# Patient Record
Sex: MALE | Race: WHITE
[De-identification: names, ages, dates, MRNs, and addresses within clinical notes are randomized per-mention and may not be internally consistent; named-entity substitution may affect disease eponyms.]

---

## 2017-07-11 ENCOUNTER — HOSPITAL ENCOUNTER (EMERGENCY)
Dept: HOSPITAL 25 - ED | Age: 52
Discharge: HOME | End: 2017-07-11
Payer: MEDICARE

## 2017-07-11 VITALS — SYSTOLIC BLOOD PRESSURE: 123 MMHG | DIASTOLIC BLOOD PRESSURE: 88 MMHG

## 2017-07-11 DIAGNOSIS — S92.301A: Primary | ICD-10-CM
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
## 2017-07-11 DIAGNOSIS — F41.9: ICD-10-CM
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
## 2017-07-11 PROCEDURE — 99282 EMERGENCY DEPT VISIT SF MDM: CPT

## 2017-07-11 NOTE — ED
Lower Extremity





- HPI Summary


HPI Summary: 





Patient presents with right foot swelling and pain which has been worsening x 3 

weeks and denies injury, denies any fevers.  He states he has decreased 

sensation bilaterally in his legs at baseline.  He is anxious during physical 

exam.  He is concerned for an infection.  Denies fevers, chills or sweats. He 

states he does not work and is infrequently on his feet, but has been up more 

lately.  No pain in the calf or the posterior knee.  Denies trauma, denies 

recent travel.





- History of Current Complaint


Chief Complaint: EDSoftTissueLowExtr


Stated Complaint: RIGHT FOOT SWELLING


Time Seen by Provider: 07/11/17 19:37


Pain Intensity: 2





- Risk Factors


Gout Risk Factors: Male


DVT Risk Factors: Negative


Septic Arthritis Risk Factor: Negative





- Allergies/Home Medications


Allergies/Adverse Reactions: 


 Allergies











Allergy/AdvReac Type Severity Reaction Status Date / Time


 


Mixed Ragweed Allergy  Congestion Verified 07/11/17 19:45


 


NOTE/MISC Allergy  Congestion Verified 07/11/17 19:45














PMH/Surg Hx/FS Hx/Imm Hx


Previously Healthy: Yes


Endocrine/Hematology History: 


   Denies: Hx Diabetes


Cardiovascular History: 


   Denies: Hx Congestive Heart Failure, Hx Hypertension, Hx Pacemaker/ICD


 History: 


   Denies: Hx Renal Disease


Musculoskeletal History: 


   Denies: Hx Rheumatoid Arthritis, Hx Osteoporosis


Sensory History: 


   Denies: Hx Hearing Aid


Neurological History: Reports: Other Neuro Impairments/Disorders - Hx LUMBAR Fx


Psychiatric History: Reports: Hx Anxiety


   Denies: Hx Panic Disorder





- Surgical History


Surgery Procedure, Year, and Place: PILONIDAL CYST(LOWER BACK) X 4.  CYST ON 

LEFT ELBOW REMOVED.  BASAL CELL REMOVED FROM RIGHT CHEEK





- Immunization History


Hx Pertussis Vaccination: No


Immunizations Up to Date: Unable to Obtain/Confirm


Infectious Disease History: No


Infectious Disease History: 


   Denies: Traveled Outside the [place] in Last 30 Days





- Family History


Known Family History: 


   Negative: Cardiac Disease, Hypertension, Diabetes





- Social History


Occupation: Unemployed


Lives: Alone


Alcohol Use: Occasionally


Hx Substance Use: No


Substance Use Type: Reports: None


Substance Use Comment - Amount & Last Used: one drink every two weeks


Hx Tobacco Use: No


Smoking Status (MU): Never Smoked Tobacco





Review of Systems


Constitutional: Negative


ENT: Negative


Respiratory: Negative


Positive: no symptoms reported, see HPI


Positive: Arthralgia - right foot pain


Skin: Negative


Neurological: Negative


Positive: Anxious


All Other Systems Reviewed And Are Negative: Yes





Physical Exam


Triage Information Reviewed: Yes


Vital Signs On Initial Exam: 


 Initial Vitals











Temp Pulse Resp BP Pulse Ox


 


 97.8 F   77   16   133/91   95 


 


 07/11/17 19:45  07/11/17 19:45  07/11/17 19:45  07/11/17 19:45  07/11/17 19:45











Vital Signs Reviewed: Yes


Appearance: Positive: Well-Appearing, Well-Nourished


Skin: Positive: Warm, Skin Color Reflects Adequate Perfusion


Neck: Positive: Supple, Nontender, No Lymphadenopathy


Respiratory/Lung Sounds: Positive: Breath Sounds Present


Cardiovascular: Positive: Normal, RRR


Musculoskeletal: Positive: Pain @ - over the dorsum of the right foot


Neurological: Positive: Sensory/Motor Intact, Alert, Oriented to Person Place, 

Time


Psychiatric: Positive: Anxious


AVPU Assessment: Alert





- Zenon Coma Scale


Best Eye Response: 4 - Spontaneous


Best Motor Response: 6 - Obeys Commands


Best Verbal Response: 5 - Oriented


Coma Scale Total: 15





Diagnostics





- Vital Signs


 Vital Signs











  Temp Pulse Resp BP Pulse Ox


 


 07/11/17 19:45  97.8 F  77  16  133/91  95














- Laboratory


Lab Statement: Any lab studies that have been ordered have been reviewed, and 

results considered in the medical decision making process.





Lower Extremity Course/Dx





- Course


Course Of Treatment: Patient sent to MRI to r/o osteomyelitis sent by 

podiatrist.





- Diagnoses


Differential Diagnosis/HQI/PQRI: Positive: Infection, Sprain, Strain, Tendonitis


Provider Diagnoses: 


 Stress fracture








Discharge





- Discharge Plan


Condition: Stable


Disposition: HOME


Patient Education Materials:  Foot Fracture in Adults (ED)


Referrals: 


Ginette Greene NP [Primary Care Provider] - 


Additional Instructions: 


You have been dx with stress fracture





Stress fractures at low-risk sites are managed conservatively with the 

following interventions 


Acute pain control as needed using ice, and acetaminophen or ibuprofen


Protection of the fracture site with reduced weight-bearing or ace wrapped for 

comfort


Boot given only if you feel this reduces the pain during ambulation


Purchase supportive insoles for shoes to aid in foot structure


Reduction or modification of activities such that pain is not present


Gradual resumption of activities if pain-free


Rehabilitative exercise to promote optimal biomechanics


Proper nutrition, including additional calcium and vitamin D





Follow up with PCP 


Call next week for appt.

## 2017-07-12 NOTE — RAD
Indication: Multiple weeks RIGHT foot pain and swelling.



Comparison: June 07, 2017 radiographs.



Technique: [employer] 1.5 Sahra [id number] with GEM suite. Noncontrast MRI RIGHT foot from

level of the transverse tarsal joint proximally through the toes. Examination limited due

to multiplanar T1 and inversion recovery series.



Report: Extensive predominant subcutaneous edema as well as edema involving the intrinsic

musculature surrounding the second metatarsal. No loculated soft tissue fluid collection

evident.



Extensive T2 hyperintense marrow edema at the second metatarsal from the base through the

distal diaphysis. Nondisplaced oblique coronal fracture plane at the proximal metaphysis.

Corresponding loss of normal T1 marrow hyperintensity.



There is less specific T2 hyperintense marrow edema at the cuboid, lateral cuneiform, and

proximal aspect of the third metatarsal. Partial loss of normal T1 marrow hyperintensity

at the distal aspect of the cuboid without definitive fracture plane.



Normal articular alignment.



***Key images saved on the [facility] [place] [facility].



IMPRESSION: 

1. Nondisplaced fracture proximal metaphysis second metatarsal.

2. Noted extensive subcutaneous edema and edema surrounding the second metatarsal is

nonspecific. The edema may be secondary to the second metatarsal stress fracture and

stress reactions or reactive edema at the third metatarsal, lateral cuneiform, and cuboid

bone however cellulitis with deep soft tissue infection and osteomyelitis is not entirely

excluded. The fracture at the second metatarsal may represent an insufficiency fracture

secondary to osteomyelitis. Clinical correlation warranted.



Results discussed with Charge Nurse Beverly in the ED 7/12/2017 8:07 AM EDT

## 2018-03-12 ENCOUNTER — HOSPITAL ENCOUNTER (OUTPATIENT)
Dept: HOSPITAL 25 - OR | Age: 53
Discharge: HOME | End: 2018-03-12
Attending: ORTHOPAEDIC SURGERY
Payer: MEDICARE

## 2018-03-12 VITALS — DIASTOLIC BLOOD PRESSURE: 91 MMHG | SYSTOLIC BLOOD PRESSURE: 147 MMHG

## 2018-03-12 DIAGNOSIS — E78.5: ICD-10-CM

## 2018-03-12 DIAGNOSIS — F42.9: ICD-10-CM

## 2018-03-12 DIAGNOSIS — S92.321K: Primary | ICD-10-CM
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
## 2018-03-12 DIAGNOSIS — G89.18: ICD-10-CM

## 2018-03-12 PROCEDURE — C1713 ANCHOR/SCREW BN/BN,TIS/BN: HCPCS

## 2018-03-12 PROCEDURE — C1776 JOINT DEVICE (IMPLANTABLE): HCPCS

## 2018-03-13 NOTE — OP
DATE OF OPERATION:  03/12/18 - hospitals

 

DATE OF BIRTH:  [birth year]

 

SURGEON:  Fredy Martinez MD

 

ASSISTANT:  Fabiana Etienne PA-C

 

PRE-OP DIAGNOSIS:  Fractured nonunion right second metatarsal.

 

POST-OP DIAGNOSIS:  Fractured nonunion right second metatarsal.

 

OPERATIVE PROCEDURE:  Open reduction internal fixation right second metatarsal 
with tibial bone graft.

 

DESCRIPTION OF PROCEDURE:  The patient was taken to the operating room.  Thigh 
tourniquet inflated.  We made a longitudinal incision over the dorsum of the 
midfoot exposing the proximal shaft of the second metatarsal.  A nonunion area 
was taken down with a freer elevator and a small osteotome.  Now, we prepared 
the nonunion site with a 2.4 mm power quiana and harvested some bone graft 
proximally at Gerdy's tubercle through a 3 cm oblique incision, small 
corticotomy made with a power bur and curette used to harvest some cancellous 
bone, which was placed along the nonunion site.

 

We then used an Arthrex 3.0 mm screw plate for the proximal fixation.  Two 
proximal screw holes were made into the proximal fragment and oblique screw 
across the nonunion site and then two distal locking screws.  X-ray 
intraoperatively showed satisfactory position of the plate.  We changed one 
screw that appeared to be too long.  We then irrigated thoroughly closing with 
Vicryl and nylon suture as well as compression dressing, plaster splint.  The 
proximal tibial donor site was packed with cancellous allograft, closed with 2-
0 Vicryl staples, compression dressing.

 

 [id number]/CPS #: [id number]

JENNIFER

## 2018-03-15 ENCOUNTER — HOSPITAL ENCOUNTER (EMERGENCY)
Dept: HOSPITAL 25 - ED | Age: 53
Discharge: ANOTHER HOSPITAL | End: 2018-03-15
Payer: MEDICARE

## 2018-03-15 VITALS — DIASTOLIC BLOOD PRESSURE: 60 MMHG | SYSTOLIC BLOOD PRESSURE: 137 MMHG
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
## 2018-03-15 DIAGNOSIS — E07.9: ICD-10-CM

## 2018-03-15 DIAGNOSIS — F42.9: ICD-10-CM

## 2018-03-15 DIAGNOSIS — I10: ICD-10-CM

## 2018-03-15 DIAGNOSIS — L76.82: Primary | ICD-10-CM

## 2018-03-15 DIAGNOSIS — L03.115: ICD-10-CM

## 2018-03-15 DIAGNOSIS — F41.9: ICD-10-CM

## 2018-03-15 DIAGNOSIS — E78.00: ICD-10-CM

## 2018-03-15 DIAGNOSIS — E66.9: ICD-10-CM

## 2018-03-15 LAB
BASOPHILS # BLD AUTO: 0.1 10^3/UL (ref 0–0.2)
EOSINOPHIL # BLD AUTO: 0.2 10^3/UL (ref 0–0.6)
HCT VFR BLD AUTO: 53 % (ref 42–52)
HGB BLD-MCNC: 17.7 G/DL (ref 14–18)
INR PPP/BLD: 0.99 (ref 0.77–1.02)
LYMPHOCYTES # BLD AUTO: 1.5 10^3/UL (ref 1–4.8)
MCH RBC QN AUTO: 29 PG (ref 27–31)
MCHC RBC AUTO-ENTMCNC: 34 G/DL (ref 31–36)
MCV RBC AUTO: 87 FL (ref 80–94)
MONOCYTES # BLD AUTO: 1.5 10^3/UL (ref 0–0.8)
NEUTROPHILS # BLD AUTO: 9 10^3/UL (ref 1.5–7.7)
NRBC # BLD AUTO: 0 10^3/UL
NRBC BLD QL AUTO: 0.1
PLATELET # BLD AUTO: 197 10^3/UL (ref 150–450)
RBC # BLD AUTO: 6.07 10^6/UL (ref 4–5.4)
WBC # BLD AUTO: 12.3 10^3/UL (ref 3.5–10.8)

## 2018-03-15 PROCEDURE — 36415 COLL VENOUS BLD VENIPUNCTURE: CPT

## 2018-03-15 PROCEDURE — 85025 COMPLETE CBC W/AUTO DIFF WBC: CPT

## 2018-03-15 PROCEDURE — 99284 EMERGENCY DEPT VISIT MOD MDM: CPT

## 2018-03-15 PROCEDURE — 83605 ASSAY OF LACTIC ACID: CPT

## 2018-03-15 PROCEDURE — 87040 BLOOD CULTURE FOR BACTERIA: CPT

## 2018-03-15 PROCEDURE — 85610 PROTHROMBIN TIME: CPT

## 2018-03-15 PROCEDURE — 85730 THROMBOPLASTIN TIME PARTIAL: CPT

## 2018-03-15 PROCEDURE — 86140 C-REACTIVE PROTEIN: CPT

## 2018-03-15 PROCEDURE — 80053 COMPREHEN METABOLIC PANEL: CPT

## 2018-03-15 NOTE — ED
Viktoria PATEL Abhishek, scribed for Pito Geiger MD on 03/15/18 at 0542 .





Lower Extremity





- HPI Summary


HPI Summary: 





The pt is a 51 y/o male presenting to the [facility] with a chief complaint of right 

lower thigh/leg swelling s/p surgery since 3 hours ago. The pt states that the 

area of the swelling is warm to the touch and exhibits redness. Pt had a 

surgery two days and flesh was removed from his right knee to cover steel plate 

in his foot. Pt reports of SOB and anxiety. Pt denies of fevers and chills. 

Symptoms aggravated by palpation and alleviated by nothing. The patient rates 

the pain 5/10 in severity. Medication hx reviewed. The physician who performed 

to surgery on the pt was Dr. Martinez.





- History of Current Complaint


Chief Complaint: EDExtremityLower


Stated Complaint: RIGHT LEG SWELLING


Time Seen by Provider: 03/15/18 01:59


Hx Obtained From: Patient


Onset of Pain: Hours - 3 hours ago


Onset/Duration: Hours - since 3 hours ago


Severity Initially: Moderate


Severity Currently: Moderate


Pain Intensity: 5


Pain Scale Used: 0-10 Numeric


Timing: Constant


Location: Is Discrete @ - right lower leg


Character Of Pain: Burning


Associated Signs And Symptoms: Positive: Swelling


Aggravating Factor(s): Other - palpation


Alleviating Factor(s): Nothing





- Allergies/Home Medications


Allergies/Adverse Reactions: 


 Allergies











Allergy/AdvReac Type Severity Reaction Status Date / Time


 


ragweed pollen Allergy Intermediate Congestion Verified 03/12/18 06:58














PMH/Surg Hx/FS Hx/Imm Hx


Endocrine/Hematology History: Reports: Hx Thyroid Disease - on meds


   Denies: Hx Diabetes, Hx Anemia - blood levels too high, has blood taken to 

thin out


Cardiovascular History: 


   Denies: Hx Congestive Heart Failure, Hx Hypertension, Hx Pacemaker/ICD


Respiratory History: Reports: Hx Asthma, Hx Sleep Apnea - no machine


 History: 


   Denies: Hx Renal Disease


Musculoskeletal History: Reports: Hx Arthritis, Other Musculoskeletal History - 

broke back 15 years ago


   Denies: Hx Rheumatoid Arthritis, Hx Osteoporosis


Sensory History: Reports: Hx Contacts or Glasses - reading


   Denies: Hx Cataracts, Hx Glaucoma, Hx Hearing Aid


Opthamlomology History: Reports: Hx Contacts or Glasses - reading


   Denies: Hx Cataracts, Hx Glaucoma


Neurological History: Reports: Other Neuro Impairments/Disorders - Hx LUMBAR Fx


Psychiatric History: Reports: Hx Anxiety, Hx Depression


   Denies: Hx Panic Disorder





- Cancer History


Hx Chemotherapy: No





- Surgical History


Surgery Procedure, Year, and Place: PILONIDAL CYST(LOWER BACK) X 4.  CYST ON 

LEFT ELBOW REMOVED.  BASAL CELL REMOVED FROM RIGHT CHEEK


Hx Anesthesia Reactions: No


Infectious Disease History: No


Infectious Disease History: 


   Denies: Traveled Outside the [place] in Last 30 Days





- Family History


Known Family History: 


   Negative: Cardiac Disease, Hypertension, Diabetes





- Social History


Alcohol Use: Occasionally


Hx Substance Use: No


Substance Use Type: Reports: None


Substance Use Comment - Amount & Last Used: one drink every two weeks


Hx Tobacco Use: No


Smoking Status (MU): Never Smoked Tobacco





Review of Systems


Negative: Fever, Chills


Eyes: Negative


ENT: Negative


Cardiovascular: Negative


Respiratory: Negative


Gastrointestinal: Negative


Genitourinary: Negative


Positive: Edema - right lower leg/thigh


Skin: Other - redness in the right lower leg/thigh that is warm to touch


Neurological: Negative


Positive: Anxious


All Other Systems Reviewed And Are Negative: Yes





Physical Exam





- Summary


Physical Exam Summary: 








VITAL SIGNS: Reviewed.


GENERAL: ~Patient is a well-developed and nourished (MALE). Pt is anxious and 

flushed. Patient is not in any acute respiratory distress.


HEAD AND FACE: No signs of trauma. No ecchymosis, hematomas or skull 

depressions. No sinus tenderness.


EYES: PERRLA, EOMI x 2, No injected conjunctiva, no nystagmus.


EARS: Hearing grossly intact. Ear canals and tympanic membranes are within 

normal limits.


MOUTH: Oropharynx within normal limits.


NECK: Supple, trachea is midline, no adenopathy, no JVD, no carotid bruit, no c-

spine tenderness, neck with full ROM.


CHEST: Symmetric, n


SKIN: Dry and warm


o tenderness at palpation


LUNGS: Clear to auscultation bilaterally. No wheezing or crackles.


CVS: Regular rate and rhythm, S1 and S2 present, no murmurs or gallops 

appreciated.


ABDOMEN: Soft, non-tender. Belly Distended. No rebound no guarding, and no 

masses palpated. Bowel sounds are normal.


EXTREMITIES: Used splint ortho glass by orthopedist, below the knee to the 

right foot. Right ankle to mid thigh shows redness. 


NEURO: Alert and oriented x 3. No acute neurological deficits. Speech is normal 

and follows commands.


Triage Information Reviewed: Yes


Vital Signs On Initial Exam: 


 Initial Vitals











Temp Pulse Resp BP Pulse Ox


 


 97.3 F   96   16   126/82   99 


 


 03/15/18 01:44  03/15/18 01:44  03/15/18 01:44  03/15/18 01:44  03/15/18 01:44











Vital Signs Reviewed: Yes





Diagnostics





- Vital Signs


 Vital Signs











  Temp Pulse Resp BP Pulse Ox


 


 03/15/18 05:04  98 F  96  18  118/62 


 


 03/15/18 05:00   93  13  99/45  95


 


 03/15/18 04:30   92  18  109/66  96


 


 03/15/18 04:17  99 F  92  18  121/54  98


 


 03/15/18 04:08   92  16  121/54  96


 


 03/15/18 04:00   93  22  143/116  97


 


 03/15/18 03:30   85  17  135/79  97


 


 03/15/18 03:22   84  19  132/71  97


 


 03/15/18 03:03   84  24  131/74  97


 


 03/15/18 03:00   85  20   97


 


 03/15/18 02:47   84  18   96


 


 03/15/18 02:45     109/75 


 


 03/15/18 01:44  97.3 F  96  16  126/82  99














- Laboratory


Lab Results: 


 Lab Results











  03/15/18 03/15/18 03/15/18 Range/Units





  02:30 02:30 02:30 


 


WBC   12.3 H   (3.5-10.8)  10^3/ul


 


RBC   6.07 H   (4.0-5.4)  10^6/ul


 


Hgb   17.7   (14.0-18.0)  g/dl


 


Hct   53 H   (42-52)  %


 


MCV   87   (80-94)  fL


 


MCH   29   (27-31)  pg


 


MCHC   34   (31-36)  g/dl


 


RDW   14   (10.5-15)  %


 


Plt Count   197   (150-450)  10^3/ul


 


MPV   8   (7.4-10.4)  um3


 


Neut % (Auto)   73.0   (38-83)  %


 


Lymph % (Auto)   12.6 L   (25-47)  %


 


Mono % (Auto)   12.2 H   (0-7)  %


 


Eos % (Auto)   1.8   (0-6)  %


 


Baso % (Auto)   0.4   (0-2)  %


 


Absolute Neuts (auto)   9.0 H   (1.5-7.7)  10^3/ul


 


Absolute Lymphs (auto)   1.5   (1.0-4.8)  10^3/ul


 


Absolute Monos (auto)   1.5 H   (0-0.8)  10^3/ul


 


Absolute Eos (auto)   0.2   (0-0.6)  10^3/ul


 


Absolute Basos (auto)   0.1   (0-0.2)  10^3/ul


 


Absolute Nucleated RBC   0   10^3/ul


 


Nucleated RBC %   0.1   


 


INR (Anticoag Therapy)  0.99    (0.77-1.02)  


 


APTT  30.3    (26.0-36.3)  seconds


 


Sodium    133  (133-145)  mmol/L


 


Potassium    4.0  (3.5-5.0)  mmol/L


 


Chloride    100 L  (101-111)  mmol/L


 


Carbon Dioxide    25  (22-32)  mmol/L


 


Anion Gap    8  (2-11)  mmol/L


 


BUN    12  (6-24)  mg/dL


 


Creatinine    0.92  (0.67-1.17)  mg/dL


 


Est GFR ( Amer)    110.7  (>60)  


 


Est GFR (Non-Af Amer)    86.1  (>60)  


 


BUN/Creatinine Ratio    13.0  (8-20)  


 


Glucose    92  ()  mg/dL


 


Lactic Acid     (0.5-2.0)  mmol/L


 


Calcium    9.6  (8.6-10.3)  mg/dL


 


Total Bilirubin    1.00  (0.2-1.0)  mg/dL


 


AST    23  (13-39)  U/L


 


ALT    19  (7-52)  U/L


 


Alkaline Phosphatase    84  ()  U/L


 


C-Reactive Protein    146.74 H  (< 5.00)  mg/L


 


Total Protein    7.0  (6.4-8.9)  g/dL


 


Albumin    3.8  (3.2-5.2)  g/dL


 


Globulin    3.2  (2-4)  g/dL


 


Albumin/Globulin Ratio    1.2  (1-3)  














  03/15/18 Range/Units





  02:30 


 


WBC   (3.5-10.8)  10^3/ul


 


RBC   (4.0-5.4)  10^6/ul


 


Hgb   (14.0-18.0)  g/dl


 


Hct   (42-52)  %


 


MCV   (80-94)  fL


 


MCH   (27-31)  pg


 


MCHC   (31-36)  g/dl


 


RDW   (10.5-15)  %


 


Plt Count   (150-450)  10^3/ul


 


MPV   (7.4-10.4)  um3


 


Neut % (Auto)   (38-83)  %


 


Lymph % (Auto)   (25-47)  %


 


Mono % (Auto)   (0-7)  %


 


Eos % (Auto)   (0-6)  %


 


Baso % (Auto)   (0-2)  %


 


Absolute Neuts (auto)   (1.5-7.7)  10^3/ul


 


Absolute Lymphs (auto)   (1.0-4.8)  10^3/ul


 


Absolute Monos (auto)   (0-0.8)  10^3/ul


 


Absolute Eos (auto)   (0-0.6)  10^3/ul


 


Absolute Basos (auto)   (0-0.2)  10^3/ul


 


Absolute Nucleated RBC   10^3/ul


 


Nucleated RBC %   


 


INR (Anticoag Therapy)   (0.77-1.02)  


 


APTT   (26.0-36.3)  seconds


 


Sodium   (133-145)  mmol/L


 


Potassium   (3.5-5.0)  mmol/L


 


Chloride   (101-111)  mmol/L


 


Carbon Dioxide   (22-32)  mmol/L


 


Anion Gap   (2-11)  mmol/L


 


BUN   (6-24)  mg/dL


 


Creatinine   (0.67-1.17)  mg/dL


 


Est GFR ( Amer)   (>60)  


 


Est GFR (Non-Af Amer)   (>60)  


 


BUN/Creatinine Ratio   (8-20)  


 


Glucose   ()  mg/dL


 


Lactic Acid  1.6  (0.5-2.0)  mmol/L


 


Calcium   (8.6-10.3)  mg/dL


 


Total Bilirubin   (0.2-1.0)  mg/dL


 


AST   (13-39)  U/L


 


ALT   (7-52)  U/L


 


Alkaline Phosphatase   ()  U/L


 


C-Reactive Protein   (< 5.00)  mg/L


 


Total Protein   (6.4-8.9)  g/dL


 


Albumin   (3.2-5.2)  g/dL


 


Globulin   (2-4)  g/dL


 


Albumin/Globulin Ratio   (1-3)  











Result Diagrams: 


 03/15/18 02:30





 03/15/18 02:30


Lab Statement: Any lab studies that have been ordered have been reviewed, and 

results considered in the medical decision making process.





Lower Extremity Course/Dx





- Course


Course Of Treatment: The pt is a 51 y/o male presenting to the [facility] with a 

chief complaint of right lower thigh/leg swelling that is warm to the touch. 

PSHx includes a steel plate placement in the right foot which required a piece 

of skin from the right knee. Pt received this surgery two days prior from the 

onset of the symptoms. The current symptoms began 3 hours ago approximately. Pt 

reports of SOB and anxiety. Pt is denies and is unsure of chills and fevers. 

Upon evaluation, we removed the old splint that was placed on by the 

orthopedist (Dr. Martinez). We discussed pt care with Dr. Frankenberg in the 

[facility] and he accepts pt care. The pt dx will be right lower leg cellulitis and 

will be admitted to the [facility].





- Diagnoses


Provider Diagnoses: 


 Cellulitis of right leg








- Physician Notifications


Discussed Care Of Patient With: Fred Frankenberg - We discussed pt care





Discharge





- Discharge Plan


Condition: Stable


Disposition: ADMITTED TO [facility]


Referrals: 


Ginette Greene NP [Primary Care Provider] - 





The documentation as recorded by the Viktoria jones Abhishek accurately reflects 

the service I personally performed and the decisions made by me, Pito Geiger MD.

## 2018-03-15 NOTE — CONS
CC:  [facility]; Dr. Fredy Martinez*

 

CONSULTATION REPORT:

 

DATE OF CONSULT:  03/15/18

 

PRIMARY CARE PROVIDER:  [facility].

 

SERVICE REQUESTING CONSULTATION:  Emergency room.

 

REASON FOR CONSULT:  Concern for cellulitis.

 

SOURCE OF INFORMATION:  History was obtained from interview with the patient, 
discussion with Dr. Fredy Martinez as well as Dr. Mathew as well as review of 
past medical records.

 

RELIABILITY:  Good.

 

CHIEF COMPLAINT:  Right knee swelling.

 

HISTORY OF PRESENT ILLNESS:  This is a 52-year-old man, recent surgery on 03/12/
18 including open reduction and internal fixation of right second metatarsal 
with tibial bone graft performed by Dr. Fredy Martinez who returned home, has 
been noncompliant with recommendations as reported by the patient's parents 
including has not remained off his leg and is not elevating leg who noted 
increasing swelling of his right knee and therefore presented to the emergency 
room.  Denies any fevers, chills or night sweats or pain.  He has had no cough, 
shortness of breath, nausea or vomiting as noted above, has been ambulating on 
the leg.  He was seen by the emergency room with swelling in his right leg, 
mild erythema.  He received a dose of antibiotics in the emergency room 
including vancomycin and Zosyn.  Blood cultures have been sent.  Discussed the 
care with Dr. Martinez and he confirms as long as this author does not think he 
requires IV antibiotics for developing cellulitis that the erythema may be 
secondary to some subcutaneous hemorrhage after the procedure.  My impression 
is as follows below:

 

REVIEW OF SYSTEMS:  Review of systems is negative for all systems except for 
swelling around his knee not intraarticular based on my exam.

 

PAST MEDICAL HISTORY:  Includes severe OCD, severe anxiety disorder, 
hypocholesterolemia, obesity, hypertension.

 

HOME MEDICATIONS:  Include,

 

1.  Loratadine 10 mg.

2.  Simvastatin 20 mg

3.  Levocetirizine 5 mg as needed.

4.  Mometasone 50 micrograms daily.

5.  Ventolin  micrograms twice daily.

6.  Seroquel 300 mg twice daily.

7.  Trazodone 50 mg twice daily.

8.  Clomipramine 50 mg 1 in the morning and 3 at bedtime.

9.  Gabapentin 600 mg 3 times a day.

10.  Aripiprazole 10 mg daily.

11.  Hydroxyzine 30 mg 3 times a day.

12.  Lorazepam 1 mg 3 times a day p.r.n.

 

PERTINENT VITALS IN THE EMERGENCY ROOM:  Include, T-max 99 degrees Fahrenheit, 
137/60, heart rate 95, respiratory rate is 16.  He is 95% on room air.

 

PHYSICAL EXAMINATION:  Sitting up in bed, interactive, pleasant, in no apparent 
distress.  Regular rate and rhythm.  No murmurs, rubs or gallops.  Lungs are 
clear to auscultation.  He is obese.  His oropharynx is clear.  He has moist 
mucous membranes.  His right lower extremity indicates some swelling of his 
right knee without evidence of effusion.  Skin exam is notable for some 
erythema over his right knee.  It does not involve the incision over tibial 
bone where graft was retrieved and lower section of it is completely linear, 
which will be inconsistent with cellulitis.  It extends up mildly of his right 
medial thigh.  Area was demarcated with a marker.  Nontender to palpation.  He 
is alert and oriented x3.

 

LABORATORY DATA:  Pertinent labs reviewed.  White blood cell count is 12.3 with 
73% neutrophils, hemoglobin is 17.7.  It is consistent with previous values.  
Platelets 197.  Lactic acid is 1.6, CRP is 146.

 

IMAGING STUDIES:  Data reviewed.  Lower extremity venous Doppler, no evidence 
of DVT.

 

ASSESSMENT AND PLAN:  A 52-year-old man presenting with swelling of his right 
lower extremity 2 days after surgery.  While this may represent a cellulitis, 
it may also represent postoperative changes.  He has received vancomycin and 
Zosyn in the emergency room.  I have recommended to continue Bactrim as well as 
first generation cephalosporin such as cefazolin.  Patient also agrees and we 
will prescribe to patient on discharge.  Patient was counseled at length to 
keep his leg up and not ambulate on it.  Today is Friday and he will follow up 
with Dr. Martinez on Monday. He has confirmed numerous times that he will make 
the appointment himself.  The area was demarcated and counseled the patient 
that should it extend beyond the area of demarcation or should he develop 
fevers or chills or if he develops worsening erythema, he should contact his 
physician immediately and/or present back to the emergency room.  He 
acknowledged understanding as well as his parents acknowledged understanding 
who are staying with him after the operation.

 

FOLLOWUP:  Evaluate for continued erythema and/or improvement.  Evaluate for 
continued need of antibiotics.  Blood cultures and additional antibiotics were 
drawn in the emergency room.  Please follow up, ensure they remain negative.  
No other changes in medications.

 

TIME SPENT:  Greater than 60 minutes was spent on evaluation of this patient.

 

 [id number] #: [id number]

JENNIFER

## 2018-03-16 NOTE — ED
IAlexis Angela, scribed for Michel Mathew MD on 03/15/18 at 0846 .





Progress





- Progress Note


Progress Note: 





Overnight the pt was admitted to the hospitalist services. After Dr. Cuevas saw 

the pt this morning and discussed the case with Dr. Martinez, they ordered an 

ultrasound of the right leg which was negative. Dr. Cuevas requested to 

discharge the pt home with follow up from Dr. Martinez tomorrow. He requested 

that I send the pt home with Bactrim and Cephalexin. 


I was not aware of the pt and the pt was not signed out to me, I just did the 

favor to discharge the pt home. 


Pt will be discharged home, in stable condition, with a diagnosis of cellulitis.


Condition: Stable


Disposition: Home





Course/Dx





- Diagnoses


Provider Diagnoses: 


 Cellulitis of right leg








The documentation as recorded by the Alexis jones Angela accurately reflects 

the service I personally performed and the decisions made by me, Michel Mathew MD.

## 2018-08-01 ENCOUNTER — HOSPITAL ENCOUNTER (OUTPATIENT)
Dept: HOSPITAL 25 - OR | Age: 53
Discharge: HOME | End: 2018-08-01
Attending: OTOLARYNGOLOGY
Payer: MEDICARE

## 2018-08-01 VITALS — DIASTOLIC BLOOD PRESSURE: 94 MMHG | SYSTOLIC BLOOD PRESSURE: 138 MMHG

## 2018-08-01 DIAGNOSIS — E66.01: ICD-10-CM

## 2018-08-01 DIAGNOSIS — K14.8: Primary | ICD-10-CM

## 2018-08-01 DIAGNOSIS — E78.5: ICD-10-CM

## 2018-08-01 DIAGNOSIS — J45.909: ICD-10-CM

## 2018-08-01 DIAGNOSIS — E03.9: ICD-10-CM

## 2018-08-01 DIAGNOSIS — F41.8: ICD-10-CM

## 2018-08-01 PROCEDURE — 88342 IMHCHEM/IMCYTCHM 1ST ANTB: CPT

## 2018-08-01 PROCEDURE — 88305 TISSUE EXAM BY PATHOLOGIST: CPT

## 2018-08-02 NOTE — OP
DATE OF OPERATION:  08/01/18 - [facility]

 

DATE OF BIRTH:  [birth year]

 

SURGEON:  Alec Rosales M.D.

 

PRE-OP DIAGNOSIS:  Ulcer, right lateral tongue.

 

POST-OP DIAGNOSIS:  Ulcer, right lateral tongue.

 

OPERATION PERFORMED:  Excision of right lateral tongue lesion.

 

BRIEF HISTORY:  This pleasant 52-year-old gentleman with persistent ulcer of 
the right tongue, painful, had multiple medical trials that had been 
unsuccessful.

 

DESCRIPTION OF PROCEDURE:  The patient was taken to the operating room.  The 
patient intubated.  Tongue was then examined.  Elliptical excision was carried 
out, 1 cm wide x 2 cm long.  The wound was closed in a single layer using 4-0 
Vicryl. The patient tolerated the procedure well without any complications.  He 
was awakened, extubated, and sent to recovery room in stable condition.

 

 [id number] #: [id number]

MTDD

## 2018-09-21 ENCOUNTER — HOSPITAL ENCOUNTER (EMERGENCY)
Dept: HOSPITAL 25 - ED | Age: 53
Discharge: HOME | End: 2018-09-21
Payer: MEDICARE

## 2018-09-21 VITALS — DIASTOLIC BLOOD PRESSURE: 87 MMHG | SYSTOLIC BLOOD PRESSURE: 132 MMHG
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
## 2018-09-21 DIAGNOSIS — S60.012A: Primary | ICD-10-CM

## 2018-09-21 DIAGNOSIS — M79.645: ICD-10-CM

## 2018-09-21 PROCEDURE — 99281 EMR DPT VST MAYX REQ PHY/QHP: CPT

## 2018-09-21 NOTE — ED
Upper Extremity Pain





- HPI Summary


HPI Summary: 


52 year male presents with left thumb injury today.  He states he closed it in 

a car door.  he is not on blood thinners.  He has ecchymosis noted to the 

palmar aspect of thumb at the joint.  Has subungual hematoma present. has full 

ROM with pain.  denies any previous injury to the area. is not diabetic.  

Patient requesting a nerve block.  





- History of Current Complaint


Chief Complaint: EDExtremityUpper


Stated Complaint: LT THUMB INJURY


Time Seen by Provider: 09/21/18 17:45





- Allergies/Home Medications


Allergies/Adverse Reactions: 


 Allergies











Allergy/AdvReac Type Severity Reaction Status Date / Time


 


ragweed pollen Allergy Intermediate Congestion Verified 09/21/18 16:49


 


Hayfever Allergy  Congestion Uncoded 09/21/18 16:49














PMH/Surg Hx/FS Hx/Imm Hx


Endocrine/Hematology History: Reports: Hx Thyroid Disease - on meds


   Denies: Hx Diabetes, Hx Anemia - blood levels too high, has blood taken to 

thin out


Cardiovascular History: 


   Denies: Hx Congestive Heart Failure, Hx Hypertension, Hx Pacemaker/ICD, 

Other Cardiovascular Problems/Disorders


Respiratory History: Reports: Hx Asthma, Hx Sleep Apnea - no machine


GI History: 


   Denies: Hx Gastroesophageal Reflux Disease, Other GI Disorders


 History: 


   Denies: Hx Renal Disease, Other  Problems/Disorders


Musculoskeletal History: Reports: Hx Arthritis, Other Musculoskeletal History - 

broke back 15 years ago


   Denies: Hx Rheumatoid Arthritis, Hx Osteoporosis


Sensory History: Reports: Hx Contacts or Glasses - reading


   Denies: Hx Cataracts, Hx Glaucoma, Hx Hearing Aid


Opthamlomology History: Reports: Hx Contacts or Glasses - reading


   Denies: Hx Cataracts, Hx Glaucoma


Neurological History: Reports: Hx Migraine - history of, none recent, Other 

Neuro Impairments/Disorders - Hx LUMBAR Fx


Psychiatric History: Reports: Hx Anxiety, Hx Depression


   Denies: Hx Panic Disorder





- Cancer History


Hx Chemotherapy: No





- Surgical History


Surgery Procedure, Year, and Place: PILONIDAL CYST(LOWER BACK) X 4.  CYST ON 

LEFT ELBOW REMOVED.  BASAL CELL REMOVED FROM RIGHT CHEEK.  RT FT 2ND METATARSAL 

REPAIR 3/2018


Hx Anesthesia Reactions: No


Infectious Disease History: No


Infectious Disease History: 


   Denies: Traveled Outside the [place] in Last 30 Days





- Family History


Known Family History: 


   Negative: Cardiac Disease, Hypertension, Diabetes





- Social History


Alcohol Use: Occasionally


Hx Substance Use: No


Substance Use Type: Reports: None


Substance Use Comment - Amount & Last Used: one drink every two weeks


Hx Tobacco Use: No


Smoking Status (MU): Never Smoked Tobacco





Review of Systems


Negative: Fever


Negative: Chest Pain


Negative: Shortness Of Breath


Positive: Myalgia - left thumb


Positive: Bruising - left thumb


All Other Systems Reviewed And Are Negative: Yes





Physical Exam


Triage Information Reviewed: Yes


Vital Signs On Initial Exam: 


 Initial Vitals











Temp Pulse Resp BP Pulse Ox


 


 97.7 F   80   16   132/87   97 


 


 09/21/18 16:49  09/21/18 16:49  09/21/18 16:49  09/21/18 16:49  09/21/18 16:49











Vital Signs Reviewed: Yes


Appearance: Positive: Well-Appearing


Skin: Positive: Warm, Dry, Other - ecchymosis to left thumb, subungual hematoma 

present


Head/Face: Positive: Normal Head/Face Inspection


Eyes: Positive: Normal, Conjunctiva Clear


ENT: Positive: Pharynx normal


Respiratory/Lung Sounds: Positive: Clear to Auscultation, Breath Sounds Present


Cardiovascular: Positive: Normal, RRR


Musculoskeletal: Positive: Strength/ROM Intact - left thumb, Other - capillary 

refill<2 secs, tenderness over IP joint


Neurological: Positive: Normal


Psychiatric: Positive: Normal





Procedures





- Nail Trepanation


  ** Left Thumb


Nail Trepanation Location: left thumb


Method of Drainage: nail cauterized


Sterile Dressing Applied: No - telfa, coband


Finger Splint: Yes - metal





Diagnostics





- Vital Signs


 Vital Signs











  Temp Pulse Resp BP Pulse Ox


 


 09/21/18 16:49  97.7 F  80  16  132/87  97














- Laboratory


Lab Statement: Any lab studies that have been ordered have been reviewed, and 

results considered in the medical decision making process.





- Radiology


  ** finger


Xray Interpretation: No Acute Changes


Radiology Interpretation Completed By: ED Physician





Course/Dx





- Course


Course Of Treatment: 52 year male presents with left thumb injury today.  He 

states he closed it in a car door.  he is not on blood thinners.  He has 

ecchymosis noted to the palmar aspect of thumb at the joint.  Has subungual 

hematoma present. has full ROM with pain.  denies any previous injury to the 

area. is not diabetic. capillary refill less then 2 seconds.  Patient 

requesting a nerve block.  Performed nail trepidation and placed a metal finger 

splint on the area.  X-ray read by me and Dr. jacuqes as no fracture.  Told to 

keep area elevated.  Told take ibuprofen as needed for pain.  Patient 

understands and agrees plan.





- Diagnoses


Differential Diagnosis/HQI/PQRI: Positive: Fracture (Closed), Hematoma, Strain


Provider Diagnoses: 


 Subungual hematoma, Contusion of left thumb








Discharge





- Sign-Out/Discharge


Documenting (check all that apply): Patient Departure





- Discharge Plan


Condition: Good


Disposition: HOME


Patient Education Materials:  Subungual Hematoma (ED)


Referrals: 


Abram Clark MD [Primary Care Provider] - 


Roula Lee MD [Medical Doctor] - 


Additional Instructions: 


Take Tylenol or ibuprofen every 6 hours as needed for pain


Apply ice, elevate   


Follow up with ortho if no improvement in a week


Return to ED if develop any new or worsening symptoms    





- Billing Disposition and Condition


Condition: GOOD


Disposition: Home

## 2018-09-21 NOTE — XMS REPORT
Hipolito Yancey

 Created on:2018



Patient:Hipolito Yancey

Sex:Male

:1965

External Reference #:[id number]





Demographics







 Address  [address] [place] [address]

 

 Mobile Phone  [phone]

 

 Email Address  [email or web address]

 

 Preferred Language  English

 

 Marital Status  Not  Or 

 

 Jehovah's witness Affiliation  Unknown

 

 Race  White

 

 Ethnic Group  Not  Or 









Author







 Organization  [employer]

 

 Address  [address]



   [place] [address]

 

 Phone  [phone]









Support







 Name  Relationship  Address  Phone

 

 Jennifer Yancey  Unavailable  Unavailable  [phone]









Care Team Providers







 Name  Role  Phone

 

 Abram Clark MD  Primary Care Physician  Unavailable









Payers







 Type  Date  Identification Numbers  Payment Provider  Subscriber

 

 Health Maintenance    Policy Number:  Medicare Blue o  Hipolito Yancey



 [facility] (O)    [id number]    









 Group Number: [id number]  [address] 

 

 PayID:   SUSANA Chaney [id number]









 Medigap Part B  Effective:  Policy Number:  Blue [facility]  Hipolito Yancey



   2017  [id number]  [facility]  









 Expires: 2017  Group Number: [id number]  [address] 









 PayID: [id number]  SUSANA Aaron [id number]









 Medigap Part B    Policy Number: [id number]  Medicaid  Hipolito Yancey









 Group Name: 1 1  [address]

 

 PayID: [id number]  [place] [address]







Problems







 Description

 

 No Information







Family History







 Date  Family Member(s)  Problem(s)  Comments

 

   General  Heart Disease  

 

   General  Hypertension  

 

   General  Stroke  

 

   General  Cancer  







Social History







 Type  Date  Description  Comments

 

 Lives With    Alone  

 

 Occupation    Disabled  

 

 Smoking    Patient has never smoked  







Allergies, Adverse Reactions, Alerts







 Date  Description  Reaction  Status  Severity  Comments

 

 2017  NKDA    active    







Medications







 Medication  Date  Status  Form  Strength  Qnty  SIG  Indications  Ordering



                 Provider

 

 Compression    Active  Misc    2Pair    S92.324D  Fredy Hawkins  7          mmhg knee    TIM Martinez



             high    









 R60.0









 Loratadine    Active  Tablets  10mg    Take One    Unknown



             Tablet By    



             Mouth Every    



             Day    

 

 Simvastatin    Active  Tablets  20mg    Take One    Unknown



             Tablet By    



             Mouth AT    



             Bedtime For    



             Cholesterol    

 

 Levocetirizine    Active  Tablets  5mg    Take One    Unknown



 Dihydrochloride            Tablet By    



             Mouth Every    



             Day as Needed    

 

 Mometasone Furoate    Active  Suspension  50mcg/A    Spray Two    
Unknown



         ct    Sprays In Each    



             Nostril Every    



             Day    

 

 Ventolin HFA    Active  Aerosol  108(90B    Inhale Two    Unknown



         ase)    Puffs By Mouth    



         mcg/Act    Every 4 Hours    



             as Needed    

 

 Quetiapine    Active  Tablets  300mg    Take Two    Unknown



 Fumarate            Tablets By    



             Mouth Every    



             Day    

 

 Trazodone HCL    Active  Tablets  50mg    Take One    Unknown



             Tablet By    



             Mouth Twice A    



             Day    

 

 Clomipramine HCL    Active  Capsules  50mg    Take One    Unknown



             Capsule By    



             Mouth Every    



             Morning And    



             Three AT    



             Bedtime    

 

 Gabapentin    Active  Capsules  300mg    Take 1   2    Unknown



             Capsules By    



             Mouth Three    



             Times A Day    

 

 Aripiprazole    Active  Tablets  10mg    Take One    Unknown



             Tablet By    



             Mouth Every    



             Day    

 

 Hydroxyzine HCL    Active  Tablets  50mg    Take One    Unknown



             Tablet By    



             Mouth Three    



             Times A Day    

 

 Lorazepam    Active  Tablets  1mg    Take One    Unknown



             Tablet By    



             Mouth Three    



             Times A Day    



             Maximum Daily    



             Dose   3    

 

                 

 

 Clindamycin HCL  2018 -  Hx  Capsules  300mg  15c  take one    Fredy



   2018        aps  tablet by    Juan,



             mouth three    M.D.



             times a day    



             for 5 days.    

 

 Probiotic &  2018 -  Hx  Capsules    10c  Take one    Frdey



 Acidophilus  2018        aps  tablet daily    Juan,



 Formula Extra            by mouth for    M.D.



 Strength            10 days.    

 

 Diphenhydramine  2018 -  Hx  Tablets  25mg  30t  Take 1 by    Fredy



 HCL  2018        abs  mouth three    Juan,



             times a day    M.D.

 

 Oxycodone HCL  2018 -  Hx  Tablets  5mg  15t  1 tab by mouth    Alejandro 
F



   2018        abs  every 6 hours    Cynthia,



             as needed for    MD



             pain.    

 

 Nystatin   -  Hx  Suspension  622989M    Swish And    Unknown



   2017      nit/ML    Swallow 5 ML    



             By Mouth Four    



             Times A Day    



             For 7 Days    



             Retain In    

 

 Symbicort   -  Hx  Aerosol  160-4.5    Inhale Two    Unknown



   2017      mcg/Act    Puffs By Mouth    



             Twice A Day    

 

 Sulfamethoxazole/T   -  Hx  Tablets  800-160        Ramona,



 rimethoprim DS  2017      DILEEP [place]







Medications Administered in Office







 Medication  Date  Status  Form  Strength  Qnty  SIG  Indications  Ordering



                 Provider

 

 Triamcinolone  /  Administered  Injection          Sumanth



 (Kenalog)                MD Mary

 

 Triamcinolone  /  Administered  Injection          Sumanth



 (Kenalog)                MD Mary







Vital Signs







 Date  Vital  Result  Comment

 

 2018  Height  65 inches  5'5"









 Weight  220.00 lb  

 

 Heart Rate  84 /min  

 

 BP Systolic Sitting  126 mmHg  

 

 BP Diastolic Sitting  80 mmHg  

 

 Respiratory Rate  16 /min  

 

 Pain Level  0  

 

 BMI (Body Mass Index)  36.6 kg/m2  









 2018  Height  65 inches  5'5"









 Weight  220.00 lb  

 

 Heart Rate  67 /min  

 

 Respiratory Rate  15 /min  

 

 Pain Level  2  

 

 BMI (Body Mass Index)  36.6 kg/m2  









 2018  Height  65 inches  5'5"









 Weight  220.00 lb  

 

 Heart Rate  72 /min  

 

 BP Systolic Sitting  134 mmHg  

 

 BP Diastolic Sitting  84 mmHg  

 

 Respiratory Rate  19 /min  

 

 Body Temperature  97.0 F  

 

 Pain Level  0  

 

 BMI (Body Mass Index)  36.6 kg/m2  









 05/10/2018  Height  65 inches  5'5"









 Weight  220.00 lb  

 

 BP Systolic  134 mmHg  

 

 BP Diastolic  82 mmHg  

 

 Respiratory Rate  20 /min  

 

 Body Temperature  97.7 F  

 

 Pain Level  0  

 

 BMI (Body Mass Index)  36.6 kg/m2  









 2018  Height  65 inches  5'5"









 Weight  220.00 lb  

 

 BP Systolic  142 mmHg  

 

 BP Diastolic  80 mmHg  

 

 Respiratory Rate  18 /min  

 

 Pain Level  0  

 

 BMI (Body Mass Index)  36.6 kg/m2  









 2018  Heart Rate  74 /min  









 BP Systolic  138 mmHg  

 

 BP Diastolic  78 mmHg  

 

 Respiratory Rate  14 /min  

 

 Body Temperature  98.3 F  

 

 Pain Level  0  









 2018  Heart Rate  88 /min  









 BP Systolic  130 mmHg  

 

 BP Diastolic  80 mmHg  

 

 Respiratory Rate  16 /min  

 

 Body Temperature  97.2 F  









 2018  Height  65 inches  5'5"









 Weight  215.00 lb  

 

 Heart Rate  94 /min  

 

 Respiratory Rate  15 /min  

 

 Body Temperature  98.4 F  

 

 Pain Level  6  

 

 BMI (Body Mass Index)  35.8 kg/m2  









 2018  Height  65 inches  5'5"









 Weight  215.00 lb  

 

 BP Systolic  122 mmHg  

 

 BP Diastolic  68 mmHg  

 

 Respiratory Rate  20 /min  

 

 Body Temperature  97.8 F  

 

 Pain Level  5  

 

 BMI (Body Mass Index)  35.8 kg/m2  









 2018  Height  65 inches  5'5"









 Weight  215.00 lb  

 

 Heart Rate  88 /min  

 

 Respiratory Rate  17 /min  

 

 Body Temperature  98.1 F  

 

 Pain Level  0  

 

 BMI (Body Mass Index)  35.8 kg/m2  









 2018  Height  65 inches  5'5"









 Weight  215.00 lb  

 

 Heart Rate  83 /min  

 

 Respiratory Rate  14 /min  

 

 Body Temperature  97.3 F  

 

 Pain Level  5  

 

 BMI (Body Mass Index)  35.8 kg/m2  









 2018  Height  65 inches  5'5"









 Weight  215.00 lb  

 

 Heart Rate  79 /min  

 

 BP Systolic  125 mmHg  

 

 BP Diastolic  85 mmHg  

 

 Body Temperature  96.5 F  

 

 BMI (Body Mass Index)  35.8 kg/m2  









 2017  Height  65 inches  5'5"









 Weight  226.00 lb  

 

 Heart Rate  82 /min  

 

 Respiratory Rate  18 /min  

 

 Body Temperature  98.1 F  

 

 Pain Level  0  

 

 BMI (Body Mass Index)  37.6 kg/m2  









 2017  Height  65 inches  5'5"









 Weight  226.00 lb  

 

 BP Systolic  120 mmHg  

 

 BP Diastolic  70 mmHg  

 

 Respiratory Rate  20 /min  

 

 Pain Level  0  

 

 BMI (Body Mass Index)  37.6 kg/m2  









 2017  Heart Rate  68 /min  









 BP Systolic Sitting  118 mmHg  

 

 BP Diastolic Sitting  90 mmHg  

 

 Body Temperature  97.2 F  









 2017  Heart Rate  72 /min  









 BP Systolic  115 mmHg  

 

 BP Diastolic  90 mmHg  

 

 Body Temperature  96.7 F  









 2017  Height  65 inches  5'5"









 Weight  226.00 lb  

 

 Heart Rate  80 /min  

 

 BP Systolic Sitting  138 mmHg  

 

 BP Diastolic Sitting  96 mmHg  

 

 Respiratory Rate  14 /min  

 

 Body Temperature  97.5 F  

 

 BMI (Body Mass Index)  37.6 kg/m2  









 2017  Height  65 inches  5'5"









 Weight  218.00 lb  

 

 Heart Rate  78 /min  

 

 BP Systolic  132 mmHg  

 

 BP Diastolic  92 mmHg  

 

 Respiratory Rate  18 /min  

 

 Body Temperature  97.4 F  

 

 BMI (Body Mass Index)  36.3 kg/m2  









 2017  Height  65 inches  5'5"









 Weight  221.00 lb  

 

 Heart Rate  72 /min  

 

 BP Systolic Sitting  120 mmHg  

 

 BP Diastolic Sitting  84 mmHg  

 

 Respiratory Rate  14 /min  

 

 Body Temperature  99.0 F  

 

 BMI (Body Mass Index)  36.8 kg/m2  









 2017  Height  65 inches  5'5"









 Weight  218.00 lb  

 

 Heart Rate  90 /min  

 

 BP Systolic  134 mmHg  

 

 BP Diastolic  86 mmHg  

 

 Respiratory Rate  18 /min  

 

 Body Temperature  97.7 F  

 

 Pain Level  5  

 

 BMI (Body Mass Index)  36.3 kg/m2  









 2017  Height  64 inches  5'4"









 Weight  215.00 lb  

 

 Heart Rate  62 /min  

 

 BP Systolic  116 mmHg  

 

 BP Diastolic  82 mmHg  

 

 Respiratory Rate  16 /min  

 

 Body Temperature  96.9 F  

 

 BMI (Body Mass Index)  36.9 kg/m2  







Results







 Test  Date  Test  Result  H/L  Range  Note

 

 Laboratory test  2018  Surgical Pathology  SEE RESULT BELOW      1



 finding            

 

 CBC Auto Diff  2018  White Blood Count  11.3 10^3/uL  High  3.5-10.8  









 Red Blood Count  5.79 10^6/uL  High  4.0-5.4  

 

 Hemoglobin  17.1 g/dL    14.0-18.0  

 

 Hematocrit  51 %    42-52  

 

 Mean Corpuscular Volume  88 fL    80-94  

 

 Mean Corpuscular Hemoglobin  30 pg    27-31  

 

 Mean Corpuscular HGB Conc  34 g/dL    31-36  

 

 Red Cell Distribution Width  14 %    10.5-15  

 

 Platelet Count  235 10^3/uL    150-450  

 

 Mean Platelet Volume  8 um3    7.4-10.4  

 

 Abs Neutrophils  8.6 10^3/uL  High  1.5-7.7  

 

 Abs Lymphocytes  1.6 10^3/uL    1.0-4.8  

 

 Abs Monocytes  0.9 10^3/uL  High  0-0.8  

 

 Abs Eosinophils  0.1 10^3/uL    0-0.6  

 

 Abs Basophils  0.1 10^3/uL    0-0.2  

 

 Abs Nucleated RBC  0.1 10^3/uL      

 

 Granulocyte %  76.2 %    38-83  

 

 Lymphocyte %  14.4 %  Low  25-47  

 

 Monocyte %  7.8 %  High  0-7  

 

 Eosinophil %  1.0 %    0-6  

 

 Basophil %  0.6 %    0-2  

 

 Nucleated Red Blood Cells %  0.7      









 CBC Auto Diff  2017  White Blood Count  8.9 10^3/uL    3.5-10.8  









 Red Blood Count  6.13 10^6/uL  High  4.0-5.4  

 

 Hemoglobin  18.4 g/dL  High  14.0-18.0  

 

 Hematocrit  56 %  High  42-52  

 

 Mean Corpuscular Volume  91 fL    80-94  

 

 Mean Corpuscular Hemoglobin  30 pg    27-31  

 

 Mean Corpuscular HGB Conc  33 g/dL    31-36  

 

 Red Cell Distribution Width  14 %    10.5-15  

 

 Platelet Count  220 10^3/uL    150-450  

 

 Mean Platelet Volume  8 um3    7.4-10.4  

 

 Abs Neutrophils  5.7 10^3/uL    1.5-7.7  

 

 Abs Lymphocytes  2.1 10^3/uL    1.0-4.8  

 

 Abs Monocytes  1.0 10^3/uL  High  0-0.8  

 

 Abs Eosinophils  0.2 10^3/uL    0-0.6  

 

 Abs Basophils  0 10^3/uL    0-0.2  

 

 Abs Nucleated RBC  0.01 10^3/uL      

 

 Granulocyte %  63.5 %    38-83  

 

 Lymphocyte %  23.5 %  Low  25-47  

 

 Monocyte %  10.8 %  High  1-9  

 

 Eosinophil %  1.8 %    0-6  

 

 Basophil %  0.4 %    0-2  

 

 Nucleated Red Blood Cells %  0.1      









 Laboratory test finding  2017  Hepatitis C Antibody  Nonreactive    
Nonreactive  









 C Reactive Protein  2.52 mg/L    < 5.00  2









 HIV 1/2 AB Evaluation  2017  HIV 1 2 Antibody  Nonreactive    
Nonreactive  3

 

 Laboratory test finding  2017  C Reactive Protein  18.21 mg/L  High  < 
5.00  4

 

 CBC Auto Diff  2017  White Blood Count  9.5 10^3/uL    3.5-10.8  









 Red Blood Count  6.50 10^6/uL  High  4.0-5.4  

 

 Hemoglobin  19.8 g/dL  High  14.0-18.0  

 

 Hematocrit  61 %  High  42-52  

 

 Mean Corpuscular Volume  94 fL    80-94  

 

 Mean Corpuscular Hemoglobin  31 pg    27-31  

 

 Mean Corpuscular HGB Conc  33 g/dL    31-36  

 

 Red Cell Distribution Width  15 %    10.5-15  

 

 Platelet Count  206 10^3/uL    150-450  

 

 Mean Platelet Volume  9 um3    7.4-10.4  

 

 Abs Neutrophils  6.2 10^3/uL    1.5-7.7  

 

 Abs Lymphocytes  1.8 10^3/uL    1.0-4.8  

 

 Abs Monocytes  1.2 10^3/uL  High  0-0.8  

 

 Abs Eosinophils  0.2 10^3/uL    0-0.6  

 

 Abs Basophils  0.1 10^3/uL    0-0.2  

 

 Abs Nucleated RBC  0.08 10^3/uL      

 

 Granulocyte %  65.2 %    38-83  

 

 Lymphocyte %  19.2 %  Low  25-47  

 

 Monocyte %  12.7 %  High  1-9  

 

 Eosinophil %  2.2 %    0-6  

 

 Basophil %  0.7 %    0-2  

 

 Nucleated Red Blood Cells %  0.9      









 Laboratory test finding  2017  Erythrocyte Sed Rate  0 mm/Hr    0-20  5









 Lyme Disease Serology  Negative    Negative  6

 

 Vitamin D, 1,25 Dihydroxy  33 pg/mL    18-64  7









 1  SEE RESULT BELOW



   -----------------------------------------------------------------------------
---------------



   Name:  HIPOLITO YANCEY                 : 1965    Attend Dr: 
Alvin Rosales MD



   Acct:  [id number]  Unit: [id number]  AGE: 52            Location:  OR



   Re18                        SEX: M             Status:    GELY [facility]



   -----------------------------------------------------------------------------
---------------



   



   SPEC: [id number]             YASMEEN: 18-          SUBM DR: Alvin Rosales MD



   REQ:  [id number]             RECD: 8



   STATUS: SOUT



   _



   ORDERED:  LEVEL 4, IMMUNO-FIRST



   



   FINAL DIAGNOSIS



   



   



   Tongue, right lateral aspect, biopsy:



   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  See 
comment.



   



   



   



   Comment: The sections demonstrates a somewhat sessile acutely



   inflamed verrucoid squamous proliferative lesion with focal



   architectural complexity and ulceration.  While focally



   suggestive, definitive invasion is not seen.  Dysplasia is



   difficult to evaluate due to the degree of acute inflammation



   and reactive changes.  The lesion extends to the unoriented



   inked surgical margin.  A p16 stain performed with appropriate



   controls on block B is positive confirming an HPV related



   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



   though and inflamed and traumatized verrucoid carcinoma cannot



   be entirely excluded.  Conservative reexcision or other



   ablation of this lesion may be considered.



   Alternatively,close clinical follow-up is recommended.



   



   



   Dr. Hall has reviewed this case and concurs.



   



   



   PRE-OPERATIVE DIAGNOSIS



   



   Right tongue lesion



   



   



   



   



   



   ** CONTINUED ON NEXT PAGE **



   



   DEPARTMENT OF PATHOLOGY,  [address] [place] [address]



   Phone # [phone]      Fax #[phone]



   Liam Hall M.D. Director     [facility] # [id number]



   



   



   



   RUN DATE: 18               [facility] **LIVE**         
       PAGE    2



   



   -----------------------------------------------------------------------------
---------------



   Patient: BCHIPOLITO                  [id number]     (Continued)



   -----------------------------------------------------------------------------
---------------



   



   GROSS DESCRIPTION          (Continued)



   



   



   GROSS DESCRIPTION



   



   The specimen is received in formalin labeled, Right Lateral Aspect of Tongue
, and consists



   of a 1.5 x 1.4 by up to 0.6 cm tan-gray ovoid wrinkled irregular soft tissue 
fragment which



   is inked, serially sectioned and submitted entirely in two cassettes.



   



   Signed by and Reported on: __________              Liam Hall MD  [id number]



   



   -----------------------------------------------------------------------------
---------------



   



   



   



   



   



   



   



   



   



   



   



   



   



   



   



   



   



   



   



   



   



   



   



   



   



   



   



   



   



   



   



   



   



   ** END OF REPORT **



   



   DEPARTMENT OF PATHOLOGY,  [address] [place] [address]



   Phone # [phone]      Fax #[phone]



   Liam Hall M.D. Director     [facility] # [id number]

 

 2  Acute inflammation:  >10.00

 

 3  It is recognized that currently available assays for the



   detection of antibodies to HIV-1 and/or HIV-2 may not detect



   all infected individuals. HIV antibodies may be undetectable



   in some stages of the infection and in some clinical



   conditions. The performance of this assay has not been



   established for populations of infants or children.



   



   Assayed by Chemiluminescence Microparticle Immunoassay on



   the Siemens Advia Centaur CP. Values obtained with different



   methods or kits cannot be used interchangeably.The



   diagnostic specificity of the ADVIA Centaur 1/O/2 Enhanced



   assay in the low risk population was 99.90% (6052/6058) with



   a 95% confidence interval of 99.78 to 99.96%.

 

 4  Acute inflammation:  >10.00

 

 5  [id number]

 

 6  Serologic response to B. burgdorferi infection is not



   detected, but cannot rule out early infection during



   which low or undetectable antibody levels to



   B. burgdorferi may be present. If clinically indicated,



   a new serum specimen should be submitted in 7-14 days.



   Test Performed by:



   [facility] - [address] [address]

 

 7  -------------------ADDITIONAL INFORMATION-------------------



   This test was developed and its performance characteristics



   determined by [facility] in a manner consistent with CLIA



   requirements. This test has not been cleared or approved by



   the U.S. Food and Drug Administration.



   Test Performed by:



   [facility] - [address] [address]







Procedures







 Date  CPT Code  Description  Status

 

 2018  09227  Repair Nonunion/Malunion Metatarsal  Completed

 

 2018  28717  Repair Nonunion/Malunion Metatarsal  Completed

 

 2018  56027  Injection Intralesional Up To And Including 7 Lesions  
Completed

 

 2018  11936  Injection Intralesional Up To And Including 7 Lesions  
Completed

 

 12/15/2017  52049  Dest Lesion Each Addl Lesion 2 Through 14 Each  Completed

 

 12/15/2017  99241  Destruction ALL Benign Or Premalignant Lesion (Other  
Completed



     Than [place]  

 

 2017  96886  Destruction ALL Benign Or Premalignant Lesion (Other  
Completed



     Than [place]  

 

 2017  93970  FX Metatarsal Care  Completed

 

 2017  24537  Repair Immediate Wound 2.6-5CM  Completed



     Face/Ear/Eyelid/Nose/Lip/Muc Mem  

 

 2017  06130  Excise Malig Lesion 1.1-2CM Face/Ear/Eyelid/Nose/Lip  
Completed







Encounters







 Type  Date  Location  Provider  CPT E/M  Dx

 

 Office Visit  2018 11:30a  [facility]  Sumanth Hernandez MD  [address]  
L90.5









 R60.0

 

 D22.5

 

 Z80.8

 

 Z08

 

 Z85.828









 Office Visit  2018 10:15a  [facility]  Fredy Martinez  
[address]  S92.324K



     MILAGROS DUMONT    

 

 Office Visit  2018 11:30a  [facility]  Hunter Maxwell MD  
[address]  R60.0



     C.M.ANanci      

 

 Office Visit  03/15/2018 10:43a  [facility] [place],  [address]  
L03.115



     [facility]  M.D.    

 

 Office Visit  2018 11:15a  [facility]  Fredy Martinez  
[address]  S92.324G



     MILAGRSO DUMONT    

 

 Office Visit  2018  9:45a  [facility]  Fredy Martinez  
[address]  S92.324D



     MILAGROS DUMONT    

 

 Office Visit  2018 11:30a  [facility]  Sumanth Hernandez MD  [address]  
L21.8









 L90.5









 Office Visit  2017 11:30a  [facility]  Hunter Maxwell MD  [address]
  S92.324S



     Of MILAGROS      









 M25.474









 Office Visit  2017  1:15p  [facility]  Fabiana Etienne  [address]  
S92.324D



     Of C.MTEA  RPA-C    

 

 Office Visit  2017 11:30a  [facility]  Dilip BYRD  [address]  R60.0



     Infectious Diseases  TIM Rhodes    

 

 Office Visit  2017  4:00p  [facility]  Dilip BYRD  [address]  R60.0



     Infectious Diseases  TIM Rhodes    









 S92.324A

 

 D45

 

 S92.324D









 Office Visit  2017  3:20p  [facility]  Sumanth Hernandez MD  [address]  
C44.91

 

 Office Visit  2017  9:45a  [facility]  Ananda Pierre MD  
[address]  L05.92



     [facility]      







Plan of Care

Future Appointment(s):2018 11:30 am - Fredy Martinez M[facility]2018 11:30 am - Sumanth Hernandez MD at [facility] Xgczfolxxkc63/23
/2018 - Fredy Martinez M.D.S92.324G Nondisp fx of 2nd metatarsal bone, r ft, 
7thGFollow up:5-6 weeks

## 2018-09-21 NOTE — XMS REPORT
Continuity of Care Document (CCD)

 Created on:2018



Patient:Hipolito James

Sex:Male

:1965

External Reference #:[id number]





Demographics







 Address  [address]



   [address]



   [place] [address]

 

 Home Phone  [phone]

 

 Mobile Phone  [phone]

 

 Preferred Language  en

 

 Marital Status  Not  or 

 

 Christianity Affiliation  Unknown

 

 Race  White

 

 Ethnic Group  Not  or 









Author







 Name  Abram Clark MD

 

 Address  [address]



   Unavailable



   [place] [address]









Care Team Providers







 Name  Role  Phone

 

 Abram Clark MD  Care Team Information   Unavailable









Payers







 Type  Date  Identification Numbers  Payment Provider  Subscriber

 

   Effective:  Policy Number: VYM  [employer]  Hipolito James



   2015  [id number]  [employer]  









 PayID: [id number]  [address] [address]









 [place] [address]









   Expires: 10/31/2015  Policy Number: [id number]  Medicare - [employer]  Hipolito James









 PayID: [id number]  [address]









 [place] [address]









     Policy Number: [id number]  Medicaid After Medicare  Hipolito James









 Group Name: 2 1  [address]

 

 PayID: [id number]  [place] [address]







Advance Directives







 Type  Date  Description  Status  Comment

 

 Other Directive  2011  Health Care Proxy  Current and Verified  

 

 Other Directive  2011  Power Of   Current and Verified  POA







Problems







 Description

 

 No Information







Family History







 Date  Family Member(s)  Problem(s)  Comments

 

   Father  Healthy  

 

   Mother  CAD  

 

   Mother  Cancer, Breast  

 

   Mother  Scoliosis  







Social History







 Type  Date  Description  Comments

 

 Birth Sex    Unknown  

 

 Marital Status    Single  

 

 Lives With    Alone  

 

 Smoke-Free    Home is smoke-free  

 

 Occupation    Disabled  

 

 Occupation    [employer] Years  

 

 Tobacco Use  Start: Unknown  Never Smoked Cigarettes  

 

 ETOH Use    Denies alcohol use  

 

 Recreational Drug Use    Denies Drug Use  

 

 Tobacco Use  Start: Unknown  Patient has never smoked  

 

 Guns in Home    No  







Allergies, Adverse Reactions, Alerts







 Date  Description  Reaction  Status  Severity  Comments

 

 2018  Pollen    Active    

 

 2018  Ragweed    Active    

 

 2018  Aretha Goldenrod Pollen Extract    Active    

 

 2018  Dandelion Extract    Active    

 

 2013  NKDA    Inactive    







Medications







 Medication  Date  Status  Form  Strength  Qnty  SIG  Indications  Ordering



                 Provider

 

 Clotrimazole/Be    Active  Cream  1-0.05%  15gm  1 apply to  B37.2  Abram



 tamethasone            affected    Heetderks



 Dipropionate            area twice a    , MD



             day for 2    



             weeks    

 

 Proctozone-HC    Active  Cream  2.5%  30gm  apply to  K64.9  Shawnti



   2018          hemorrhoids    R. Storm,



             3 - 4 times    FNP-C



             daily if    



             needed    

 

 Lidocaine-Prilo    Active  Cream  2.5-2.5%  25gm  apply pea  K64.9  
Shawnti



 page            sized area 3    R. Storm,



             to 4 times    FNP-C



             daily for    



             pain    

 

 Semprex-D  10/05  Active  Capsules  8-60mg  60cap  take one    Abram



           s  capsule by    Amber retana four    , MD



             times a day    

 

 Levothroid    Active  Tablets  88mcg                      DILEEP [facility]

 

 Lorazepam    Active  Tablets  1mg    1 po tid    Ginette              [facility]

 

 Trazodone HCL    Active  Tablets  50mg  30tab  take 1    Ginette        s  tablet po    Ramona,



             bid    [facility]

 

 Simvastatin    Active  Tablets  20mg  90tab  take one            s  tablet by    Ramona



             mouth at    [facility]



             bedtime for    



             cholesterol    

 

 Montelukast    Active  Tablets  10mg  30tab  take one  R05  Abram



 Sodium          s  tablet by    Amber retana at    , MD



             bedtime    

 

 Abilify    Active  Tablets  10mg    1 po hs    Unknown



   /0000              

 

 Hydroxyzine HCL    Active  Tablets  50mg  60tab  1 tab po tid    Unknown



   /0000        s      

 

 Clomipramine    Active  Capsules  50mg    1 tab po bid    Unknown



 HCL  0000              

 

 Androgel    Active  Gel  25mg/2.5G    apply 1    Unknown



   /0000      M    packet to    



             skin once    



             daily    

 

 Quetiapine    Active  Tablets  300mg    take 2    Unknown



 Fumarate  /0000          tablets po q    



             hs    

 

 Gabapentin    Active  Capsules  300mg  90cap  1 tablet po    Unknown



   /0000        s  tid    

 

 Hydrocodone    Active  Solution  7.5-325mg        Ruparelia



 Bitartrate/Acet  /0000      /15ML        ,Alvin



 aminophen                M.D.

 

                 

 

 Econazole    Hx  Cream  1%  30gm  apply twice    Ginette



 Nitrate  /2017          a day to    Ramona,



   -          groin rash    FNP-C



             as           for 2-4    



             weeks    

 

 Clotrimazole/Be    Hx  Cream  1-0.05%  15gm  1 apply to  L30.4  Abram



 tamethasone  /          affected    Heetderks



 Dipropionate  -          area twice a    , MD



             day for           week    

 

 Bactrim DS    Hx  Tablets  800-160mg  20tab  1 by mouth  M86.271          s  twice a day    Ramona,



   -          x 10 days    FNP-C



                 

 

 Meloxicam    Hx  Tablets  15mg  30tab  1 by mouth  M72.2          s  every day,    Ramona,



   -          take with    FNP-C



             food    



                 

 

 Mucinex    Hx  Tablets ER  600mg  60tab  take 1  R05      12HR    s  tablet by    Ramona,



   -          mouth twice    FNP-C



   10/05          a day as    



   /2017          needed for    



             cough and to    



             think    



             mucus..drink    



             extra water    

 

 Meloxicam  05/10  Hx  Tablets  7.5mg  60tab  take 1  M72.2          s  tablet by    Ramona,



   -          mouth twice    FNP-C



             daily with    



             food for    



             back pain    

 

 Keflex    Hx  Capsules  500mg  10cap  1 tab by            s  mouth twice    Ramona,



   -          a day    FNP-C



                 

 

 Augmentin    Hx  Tablets  875-125mg  20tab  1 by mouth  L05.91          s  twice a day    Ramona,



   -          for    FNP-C



             infection,    



             take for 10    



             days    

 

 Ibuprofen    Hx  Tablets  600mg  120ta  take one            bs  tablet by    Ramona,



   -          mouth 4    FNP-C



             times daily    



             with food as    



             needed for    



             pain    

 

 Loratadine    Hx  Tablets  10mg  30tab  1 by mouth            s  every day    Ramona,



   -              FNP-C



                 

 

 Doxycycline    Hx  Capsules  100mg  2caps  2 tablets by  S40.861A  Ginette Smithhydrate  /          mouth now    Ramona,



   -              FNP-C



                 

 

 Triamcinolone  10/25  Hx  Cream  0.1%  15gm  apply to  L30.9  Dominick



 Acetonide            affected    Anisha



   -          area 2-3    III,



             times daily    FNP-C



                 

 

 Cephalexin    Hx  Tablets  500mg  14tab  1 tablet by  K12.2  Dominick



           s  mouth twice    Anisha



   -          daily for 7    III,



             days    FNP-C



                 

 

 Keflex    Hx  Capsules  500mg  4caps  1 tab by    Arbam



             mouth twice    Heetcolbyks



   -          a day    , MD



                 

 

 Mupirocin    Hx  Ointment  2%  22gm  apply to  L08.9            affected    Anisha



   -          area 3 times    III,



             daily for 7    FNP-C



             days    

 

 Diazepam    Hx  Tablets  5mg  2tabs  take one              tablet 1    Ramona,



   -          hour prior    FNP-C



             to    



             procedure.    



             may repeat    



             x1    

 

 Zyrtec Allergy    Hx  Tablets  10mg  30tab  1 by mouth    Ginette



   /        s  every day as    Ramona,



   -          needed for    FNP-C



             allergies    



                 

 

 Mucinex    Hx  Tablets ER  600mg  60tab  take 1  J30.9      12HR    s  tablet by    COMFORT Gray,



   -          mouth twice    FNP-C



             a day as    



             needed for    



             cough and to    



             think    



             mucus..drink    



             extra water    

 

 Econazole    Hx  Cream  1%  15gm  apply to  110.9  Ginette



 Nitrate            groin area    Ramona,



   -          twice a day    FNP-C



   05/10              



   /2017              

 

 Valium    Hx  Tablets  2mg  2tabs  1 by mouth    nt          30 min prior    COMFORT Gray,



   -          to    FNP-C



             procedure,    



             may repeat    



             in one hour    



             if needed    

 

 Anusol-HC  10/22  Hx  Cream  2.5%  30uni  Apply To  455.0          ts  Rectum Three    COMFORT Gray,



   -          Times A Day    FNP-C



             as Needed    



                 

 

 Oxycodone HCL    Hx  Tablets  5mg  5five  1 by mouth  455.4            q6hr as    Raomna,



   -          needed    FNP-C



             severe pain    



   /              

 

 Lisinopril    Hx  Tablets  5mg  30tab  1 by mouth            s  every day    Ramona,



   -          replaces 10    FNP-C



             mg dose    



                 

 

 Sulfamethoxazol    Hx  Tablets  800-160mg  20tab  1 po bid for  682.8  
Shawnti



 e/Trimethoprim          s  infection,    COMFORT Gray,



 DS  -          take for 10    FNP-C



             days    



                 

 

 Fluticasone    Hx  Suspension  50mcg/Act  16gm  2 sprays    Ginette



 Propionate            into each    Ramona,



   -          nostril once    FNP-C



   02/01          daily    



   /2017              

 

 Miralax    Hx  Packet  [id number]  1mont  1 packet po  564.00          h  daily for    COMFORT Gray,



   -          stools, can    FNP-C



             increase to    



             bid if    



             needed    

 

 Proctocare-HC    Hx  Cream  2.5%  28.35  Apply To  455.0          units  Rectum Three    COMFORT Gray,



   -          Times A Day    FNP-C



   10/22          as Needed    



                 

 

 Lisinopril    Hx  Tablets  10mg  30tab  Take One            s  Tablet By    COMFORT Gray,



   -          Mouth Every    FNP-C



   07/11          Day    



   /2014              

 

 Cetirizine HCL    Hx  Tablets  10mg  30tab  1 po qd  786.2          s      Ramona,



   -              FNP-C



                 

 

 Levothroid    Hx  Tablets  75mcg    1 tab po q    Celzo,



   /0000          am on empty    Renetta



   -          stomach 1    



             hour prior    



             to meal and    



             other meds    

 

 Loratadine-D    Hx  Tablets ER  10-240mg  90tab  take one    Ginette



 24HR  /0000    24HR    s  tablet by    Ramona,



   -          mouth every    FNP-C



   01/29          day    



   /2017              







Immunizations







 CPT Code  Status  Date  Vaccine  Lot #

 

 [id number]  Given  2018  Influenza Virus Vaccine, Quadrivalent, 3 Yr >  
[id number]



       Quad, Preserv Free  

 

 [id number]  Given  10/01/2017  Influenza Virus Vaccine, Quadrivalent, 3 Yr >  



       Quad, Preserv Free  

 

 [id number]  Given  2017  Tdap (Adacel)  [id number]

 

 [id number]  Given  10/11/2016  Influenza Virus Vaccine, Quadrivalent, 3 Yr >  



       Quad, Preserv Free  

 

 [id number]  Given  10/11/2016  Prevnar-13 Pneumococcal Conjugate Vaccine  

 

 [id number]  Given  10/30/2015  Influenza Virus Vaccine, Quadrivalent, 3 Yr >  



       Quad, Preserv Free  

 

 [id number]  Given  01/10/2014  Influenza Vaccine-Preservative Free 3 Yrs And  
[id number]



       Above  







Vital Signs







 Date  Vital  Result  Comment

 

 2018  1:07pm  Weight  237.00 lb  









 Weight  107.503 kg  

 

 BP Systolic  118 mmHg  

 

 BP Diastolic  74 mmHg  

 

 Heart Rate  84 /min  

 

 Body Temperature  97.3 F  

 

 Respiratory Rate  16 /min  

 

 Height  63 inches  5'3"

 

 BMI (Body Mass Index)  42.0 kg/m2  









 2018 11:57am  Weight  252.00 lb  









 Weight  114.307 kg  

 

 BP Systolic  102 mmHg  

 

 BP Diastolic  74 mmHg  

 

 Heart Rate  88 /min  

 

 Body Temperature  98.1 F  

 

 Respiratory Rate  16 /min  









 2018  9:39am  Weight  256.00 lb  









 Weight  116.122 kg  

 

 BP Systolic  124 mmHg  

 

 BP Diastolic  78 mmHg  

 

 Heart Rate  92 /min  

 

 Body Temperature  97.5 F  

 

 Respiratory Rate  20 /min  

 

 O2 % BldC Oximetry  98 %  









 2018  5:13pm  Weight  244.00 lb  









 Weight  110.678 kg  

 

 BP Systolic  130 mmHg  

 

 BP Diastolic  84 mmHg  

 

 Heart Rate  84 /min  

 

 Body Temperature  98.5 F  

 

 Height  64 inches  5'4"

 

 BMI (Body Mass Index)  41.9 kg/m2  

 

 O2 % BldC Oximetry  93 %  









 2018  1:53pm  Weight  236.00 lb  









 Weight  107.050 kg  

 

 BP Systolic  136 mmHg  

 

 BP Diastolic  98 mmHg  

 

 Heart Rate  88 /min  

 

 Body Temperature  97.4 F  

 

 Respiratory Rate  18 /min  

 

 O2 % BldC Oximetry  98 %  









 2018  4:28pm  Weight  233.00 lb  









 Weight  105.689 kg  

 

 BP Systolic  112 mmHg  

 

 BP Diastolic  78 mmHg  

 

 Heart Rate  92 /min  

 

 Body Temperature  97.3 F  

 

 Respiratory Rate  16 /min  









 2018  2:04pm  Weight  226.00 lb  









 Weight  102.514 kg  

 

 BP Systolic  120 mmHg  

 

 BP Diastolic  80 mmHg  

 

 Heart Rate  80 /min  

 

 Body Temperature  97.3 F  

 

 Respiratory Rate  15 /min  

 

 O2 % BldC Oximetry  98 %  









 2017  1:10pm  Weight  223.00 lb  









 Weight  101.153 kg  

 

 BP Systolic  110 mmHg  

 

 BP Diastolic  72 mmHg  

 

 Heart Rate  80 /min  

 

 Body Temperature  97.6 F  

 

 O2 % BldC Oximetry  97 %  









 2017 11:41am  Weight  218.00 lb  









 Weight  98.885 kg  

 

 BP Systolic  110 mmHg  

 

 BP Diastolic  78 mmHg  

 

 Heart Rate  96 /min  

 

 Body Temperature  98.1 F  

 

 Respiratory Rate  20 /min  

 

 O2 % BldC Oximetry  98 %  









 2017  3:11pm  Weight  223.00 lb  









 Weight  101.153 kg  

 

 BP Systolic  120 mmHg  

 

 BP Diastolic  80 mmHg  

 

 Heart Rate  84 /min  









 2017 10:08am  Weight  219.00 lb  









 Weight  99.338 kg  

 

 BP Systolic  120 mmHg  

 

 BP Diastolic  70 mmHg  

 

 Heart Rate  76 /min  

 

 Body Temperature  97.8 F  









 2017  9:00am  Weight  216.00 lb  









 Weight  97.978 kg  

 

 BP Systolic  100 mmHg  

 

 BP Diastolic  74 mmHg  

 

 Heart Rate  82 /min  

 

 Body Temperature  98.3 F  

 

 Respiratory Rate  16 /min  

 

 O2 % BldC Oximetry  98 %  









 2017 10:55am  Weight  219.00 lb  









 Weight  99.338 kg  

 

 BP Systolic  90 mmHg  

 

 BP Diastolic  64 mmHg  

 

 Heart Rate  86 /min  

 

 Body Temperature  97.9 F  

 

 Respiratory Rate  16 /min  









 2017 11:11am  Weight  220.00 lb  









 Weight  99.792 kg  

 

 BP Systolic  112 mmHg  

 

 BP Diastolic  70 mmHg  

 

 Heart Rate  72 /min  

 

 Body Temperature  98.2 F  

 

 Respiratory Rate  16 /min  









 05/15/2017  2:29pm  Weight  215.00 lb  









 Weight  97.524 kg  

 

 BP Systolic  126 mmHg  

 

 BP Diastolic  80 mmHg  

 

 Heart Rate  94 /min  

 

 Body Temperature  96.7 F  

 

 Respiratory Rate  20 /min  

 

 O2 % BldC Oximetry  97 %  









 05/10/2017 11:02am  Weight  221.00 lb  









 Weight  100.246 kg  

 

 BP Systolic  122 mmHg  

 

 BP Diastolic  82 mmHg  

 

 Heart Rate  90 /min  

 

 Body Temperature  98.6 F  

 

 Respiratory Rate  18 /min  

 

 O2 % BldC Oximetry  95 %  









 2017  9:42am  Weight  222.00 lb  









 Weight  100.699 kg  

 

 BP Systolic  116 mmHg  

 

 BP Diastolic  80 mmHg  

 

 Heart Rate  86 /min  

 

 Body Temperature  97.4 F  

 

 Respiratory Rate  16 /min  

 

 O2 % BldC Oximetry  96 %  









 2017 10:59am  Weight  216.00 lb  









 Weight  97.978 kg  

 

 BP Systolic  120 mmHg  

 

 BP Diastolic  74 mmHg  

 

 Heart Rate  76 /min  

 

 Body Temperature  97.9 F  

 

 Respiratory Rate  16 /min  









 2017  3:48pm  Weight  217.00 lb  









 Weight  98.431 kg  

 

 BP Systolic  120 mmHg  

 

 BP Diastolic  80 mmHg  

 

 Heart Rate  80 /min  

 

 Body Temperature  97.2 F  

 

 Respiratory Rate  16 /min  









 2017  3:04pm  Weight  220.00 lb  









 Weight  99.792 kg  

 

 BP Systolic  98 mmHg  

 

 BP Diastolic  76 mmHg  

 

 Heart Rate  100 /min  

 

 Body Temperature  98.0 F  

 

 Respiratory Rate  18 /min  

 

 O2 % BldC Oximetry  96 %  









 2016  9:37am  Weight  218.00 lb  









 Weight  98.885 kg  

 

 BP Systolic  130 mmHg  

 

 BP Diastolic  85 mmHg  

 

 Heart Rate  84 /min  

 

 Body Temperature  97.1 F  









 2016 10:30am  Weight  210.00 lb  









 Weight  95.256 kg  

 

 BP Systolic  110 mmHg  

 

 BP Diastolic  80 mmHg  

 

 Heart Rate  80 /min  

 

 Body Temperature  97.2 F  

 

 Respiratory Rate  16 /min  









 10/25/2016  9:04am  Weight  202.00 lb  









 Weight  91.627 kg  

 

 BP Systolic  132 mmHg  

 

 BP Diastolic  94 mmHg  

 

 Heart Rate  80 /min  

 

 Body Temperature  97.6 F  

 

 Respiratory Rate  20 /min  

 

 O2 % BldC Oximetry  97 %  









 2016  8:59am  Weight  206.00 lb  









 Weight  93.442 kg  

 

 BP Systolic  116 mmHg  

 

 BP Diastolic  72 mmHg  

 

 Heart Rate  88 /min  

 

 Body Temperature  97.5 F  

 

 Respiratory Rate  17 /min  

 

 O2 % BldC Oximetry  97 %  









 2016 11:11am  Weight  205.00 lb  









 Weight  92.988 kg  

 

 BP Systolic  100 mmHg  

 

 BP Diastolic  70 mmHg  

 

 Heart Rate  76 /min  

 

 Body Temperature  97.8 F  

 

 Respiratory Rate  20 /min  









 2016 10:05am  Weight  203.00 lb  









 Weight  92.081 kg  

 

 BP Systolic  120 mmHg  

 

 BP Diastolic  76 mmHg  

 

 Heart Rate  100 /min  

 

 Body Temperature  97.8 F  

 

 Respiratory Rate  20 /min  









 2016  9:00am  Weight  207.00 lb  









 Weight  93.895 kg  

 

 BP Systolic  118 mmHg  

 

 BP Diastolic  80 mmHg  

 

 Heart Rate  76 /min  

 

 Body Temperature  97.8 F  

 

 Respiratory Rate  20 /min  









 2016  9:23am  Weight  211.00 lb  









 Weight  95.710 kg  

 

 BP Systolic  108 mmHg  

 

 BP Diastolic  78 mmHg  

 

 Heart Rate  80 /min  

 

 Height  63 inches  5'3"

 

 BMI (Body Mass Index)  37.4 kg/m2  









 2016  2:39pm  Weight  214.00 lb  









 Weight  97.070 kg  

 

 BP Systolic  117 mmHg  

 

 BP Diastolic  86 mmHg  

 

 Heart Rate  84 /min  

 

 Body Temperature  96.8 F  









 2015 11:11am  Weight  218.00 lb  









 Weight  98.885 kg  

 

 BP Systolic  112 mmHg  

 

 BP Diastolic  80 mmHg  

 

 Heart Rate  92 /min  









 2015  3:50pm  Weight  220.00 lb  









 Weight  99.792 kg  

 

 BP Systolic  118 mmHg  

 

 BP Diastolic  78 mmHg  

 

 Heart Rate  80 /min  









 2015  4:41pm  Weight  220.00 lb  









 Weight  99.792 kg  

 

 BP Systolic  100 mmHg  

 

 BP Diastolic  70 mmHg  

 

 Heart Rate  108 /min  

 

 Body Temperature  98.7 F  

 

 O2 % BldC Oximetry  98 %  









 2014 12:40pm  Weight  192.00 lb  









 Weight  87.091 kg  

 

 BP Systolic  94 mmHg  

 

 BP Diastolic  66 mmHg  

 

 Heart Rate  88 /min  

 

 Body Temperature  97.4 F  









 10/03/2014 10:31am  Weight  185.00 lb  









 Weight  83.916 kg  

 

 BP Systolic  102 mmHg  

 

 BP Diastolic  66 mmHg  

 

 Heart Rate  68 /min  









 2014  8:12am  Weight  181.00 lb  









 Weight  82.102 kg  

 

 BP Systolic  102 mmHg  

 

 BP Diastolic  70 mmHg  

 

 Heart Rate  76 /min  









 2014  8:50am  BP Systolic  108 mmHg  









 BP Diastolic  54 mmHg  









 2014  9:00am  Weight  185.00 lb  









 Weight  83.916 kg  

 

 BP Systolic  110 mmHg  

 

 BP Diastolic  60 mmHg  

 

 Heart Rate  76 /min  









 2014  8:51am  Weight  196.00 lb  









 Weight  88.906 kg  

 

 BP Systolic  90 mmHg  repeat 98/68

 

 BP Diastolic  70 mmHg  repeat 98/68

 

 Heart Rate  76 /min  









 2014  9:48am  Weight  210.00 lb  









 Weight  95.256 kg  

 

 BP Systolic  100 mmHg  

 

 BP Diastolic  74 mmHg  

 

 Heart Rate  104 /min  

 

 Body Temperature  97.7 F  









 01/10/2014  8:40am  Weight  208.00 lb  









 Weight  94.349 kg  

 

 BP Systolic  120 mmHg  

 

 BP Diastolic  86 mmHg  

 

 Heart Rate  112 /min  









 2013 11:55am  Weight  209.00 lb  









 Weight  94.802 kg  

 

 BP Systolic  98 mmHg  

 

 BP Diastolic  66 mmHg  

 

 Heart Rate  104 /min  

 

 Body Temperature  97.5 F  









 2013 10:13am  Weight  210.00 lb  









 Weight  95.256 kg  

 

 BP Systolic  100 mmHg  

 

 BP Diastolic  60 mmHg  

 

 Heart Rate  80 /min  









 10/09/2013 12:12pm  Weight  206.00 lb  









 Weight  93.442 kg  

 

 BP Systolic  104 mmHg  

 

 BP Diastolic  76 mmHg  

 

 Heart Rate  96 /min  

 

 Body Temperature  98.6 F  

 

 O2 % BldC Oximetry  98 %  level at rest









 2013  9:00am  Weight  206.00 lb  









 Weight  93.442 kg  

 

 BP Systolic  102 mmHg  

 

 BP Diastolic  74 mmHg  

 

 Heart Rate  104 /min  

 

 Body Temperature  97.1 F  

 

 O2 % BldC Oximetry  98 %  









 2013 12:44pm  Weight  202.00 lb  









 Weight  91.627 kg  

 

 BP Systolic  110 mmHg  

 

 BP Diastolic  70 mmHg  

 

 Heart Rate  100 /min  

 

 Body Temperature  97.7 F  









 2013  8:58am  Weight  201.00 lb  









 Weight  91.174 kg  

 

 BP Systolic  110 mmHg  repeat 110/82

 

 BP Diastolic  90 mmHg  repeat 110/82

 

 Heart Rate  98 /min  

 

 Body Temperature  98.7 F  

 

 O2 % BldC Oximetry  97 %  AT Room Air









 2013 10:06am  Weight  210.75 lb  









 Weight  95.596 kg  

 

 BP Systolic  108 mmHg  

 

 BP Diastolic  84 mmHg  

 

 Heart Rate  92 /min  

 

 Body Temperature  97.9 F  

 

 O2 % BldC Oximetry  94 %  AT Room Air









 2013 11:19am  Weight  203.00 lb  









 Weight  92.081 kg  

 

 BP Systolic  104 mmHg  

 

 BP Diastolic  78 mmHg  

 

 Heart Rate  80 /min  









 2013  9:51am  Weight  198.00 lb  









 Weight  89.813 kg  

 

 BP Systolic  100 mmHg  

 

 BP Diastolic  70 mmHg  

 

 Heart Rate  110 /min  

 

 Body Temperature  98.7 F  

 

 Height  64.5 inches  5'4.50"

 

 BMI (Body Mass Index)  33.5 kg/m2  

 

 O2 % BldC Oximetry  98 %  AT Room Air







Results







 Test  Date  Facility  Test  Result  H/L  Range  Note

 

 Laboratory test  2018  [facility]  TSH  0.87 mcIU/
mL    0.34-5.60  1



 finding    [phone]  (Thyroid        



       Stim Horm)        









 Free T4 (Free Thyroxine)  0.57 ng/dL  Low  0.61-1.12  2









 Lipid Profile  2018  [facility]  Triglycerides  
230 mg/dL      3



 (Trig/Chol/HDL)    [phone]          









 Cholesterol  153 mg/dL      4

 

 HDL Cholesterol  38.2 mg/dL      5

 

 LDL Cholesterol  69 mg/dL      6









 Laboratory  2018  [facility]  Hepatitis C  
Nonreactive    Nonreactive  7



 test finding    [phone]  Antibody        

 

 Comp Metabolic  03/15/2018  [facility]  Sodium  133 mmol/
L    133-145  



 Panel    [phone]          









 Potassium  4.0 mmol/L    3.5-5.0  

 

 Chloride  100 mmol/L  Low  101-111  

 

 Co2 Carbon Dioxide  25 mmol/L    22-32  

 

 Anion Gap  8 mmol/L    2-11  

 

 Glucose  92 mg/dL      

 

 Blood Urea Nitrogen  12 mg/dL    6-24  

 

 Creatinine  0.92 mg/dL    0.67-1.17  

 

 BUN/Creatinine Ratio  13.0    8-20  

 

 Calcium  9.6 mg/dL    8.6-10.3  

 

 Total Protein  7.0 g/dL    6.4-8.9  

 

 Albumin  3.8 g/dL    3.2-5.2  

 

 Globulin  3.2 g/dL    2-4  

 

 Albumin/Globulin Ratio  1.2    1-3  

 

 Total Bilirubin  1.00 mg/dL    0.2-1.0  

 

 Alkaline Phosphatase  84 U/L      

 

 Alt  19 U/L    7-52  

 

 Ast  23 U/L    13-39  

 

 Egfr Non-  86.1    >60  

 

 Egfr   110.7    >60  8









 Laboratory test  03/15/2018  [facility]  C Reactive  
146.74 mg/L  High  < 5.00  9



 finding    [phone]  Protein        

 

 CBC Auto Diff  03/15/2018  [facility]  White Blood  12.3
  High  3.5-10.8  



     [phone]  Count  10^3/uL      









 Red Blood Count  6.07 10^6/uL  High  4.0-5.4  

 

 Hemoglobin  17.7 g/dL    14.0-18.0  

 

 Hematocrit  53 %  High  42-52  

 

 Mean Corpuscular Volume  87 fL    80-94  

 

 Mean Corpuscular Hemoglobin  29 pg    27-31  

 

 Mean Corpuscular HGB Conc  34 g/dL    31-36  

 

 Red Cell Distribution Width  14 %    10.5-15  

 

 Platelet Count  197 10^3/uL    150-450  

 

 Mean Platelet Volume  8 um3    7.4-10.4  

 

 Abs Neutrophils  9.0 10^3/uL  High  1.5-7.7  

 

 Abs Lymphocytes  1.5 10^3/uL    1.0-4.8  

 

 Abs Monocytes  1.5 10^3/uL  High  0-0.8  

 

 Abs Eosinophils  0.2 10^3/uL    0-0.6  

 

 Abs Basophils  0.1 10^3/uL    0-0.2  

 

 Abs Nucleated RBC  0 10^3/uL      

 

 Granulocyte %  73.0 %    38-83  

 

 Lymphocyte %  12.6 %  Low  25-47  

 

 Monocyte %  12.2 %  High  0-7  

 

 Eosinophil %  1.8 %    0-6  

 

 Basophil %  0.4 %    0-2  

 

 Nucleated Red Blood Cells %  0.1      









 Laboratory test  03/15/2018  [facility]  Lactic Acid  
1.6 mmol/L    0.5-2.0  10



 finding    [phone]          

 

 Inr/Protime  03/15/2018  [facility]  Inr  0.99    0.77-
1.02  



     [phone]          

 

 Laboratory test  03/15/2018  [facility]  Partial  30.3 
seconds    26.0-36.3  



 finding    [phone]  Thrombo Time        



       PTT        









 Blood Culture  SEE RESULT BELOW      11









 CBC Auto  2018  [facility]  White Blood  11.3 10^3/
uL  High  3.5-10.8  



 Diff    [phone]  Count        









 Red Blood Count  5.79 10^6/uL  High  4.0-5.4  

 

 Hemoglobin  17.1 g/dL    14.0-18.0  

 

 Hematocrit  51 %    42-52  

 

 Mean Corpuscular Volume  88 fL    80-94  

 

 Mean Corpuscular Hemoglobin  30 pg    27-31  

 

 Mean Corpuscular HGB Conc  34 g/dL    31-36  

 

 Red Cell Distribution Width  14 %    10.5-15  

 

 Platelet Count  235 10^3/uL    150-450  

 

 Mean Platelet Volume  8 um3    7.4-10.4  

 

 Abs Neutrophils  8.6 10^3/uL  High  1.5-7.7  

 

 Abs Lymphocytes  1.6 10^3/uL    1.0-4.8  

 

 Abs Monocytes  0.9 10^3/uL  High  0-0.8  

 

 Abs Eosinophils  0.1 10^3/uL    0-0.6  

 

 Abs Basophils  0.1 10^3/uL    0-0.2  

 

 Abs Nucleated RBC  0.1 10^3/uL      

 

 Granulocyte %  76.2 %    38-83  

 

 Lymphocyte %  14.4 %  Low  25-47  

 

 Monocyte %  7.8 %  High  0-7  

 

 Eosinophil %  1.0 %    0-6  

 

 Basophil %  0.6 %    0-2  

 

 Nucleated Red Blood Cells %  0.7      









 Hemoglobin/Hematacrit  2017  [facility]  
Hemoglobin  17.5    14.0-18.0  



     [phone]    g/dL      









 Hematocrit  52 %    42-52  









 Laboratory test  2017  [facility]  Rheumatoid 
Factor  <15 IU/mL    <15  12



 finding    [phone]          









 Erythrocyte Sed Rate  0 mm/Hr    0-20  13

 

 C Reactive Protein  18.21 mg/L  High  < 5.00  14









 Lyme Western  2017  [facility]  Lyme Disease  
Negative    Negative  



 Blot    [phone]  IgG Ab WB        









 Lyme Disease IgG Bands Present  No bands detecte <SEE NOTE> kDa      15

 

 Lyme Disease IgM Ab WB  Negative    Negative  

 

 Lyme Disease IgM Bands Present  No bands detecte <SEE NOTE> kDa      16

 

 Lyme Disease Interpretation  See Comment      17









 Tick-Borne Panel  2017  [facility]  Babesia  
Negative    Negative  



 PCR Blood    [phone]  microti PCR        









 Babesia ducani  Negative    Negative  

 

 Babesia divergens/Mo-1  Negative    Negative  18

 

 Anaplasma phagocytophilum  Negative    Negative  

 

 Ehrlichia chaffeensis  Negative    Negative  

 

 Ehrlichia ewingii/canis  Negative    Negative  

 

 Ehrlichia muris-like  Negative    Negative  19

 

 B. miyamotoi PCR, B  Negative    Negative  20









 CBC Auto Diff  2017  [facility]  White Blood  9.5 
10^3/uL    3.5-10.8  



     [phone]  Count        









 Red Blood Count  6.50 10^6/uL  High  4.0-5.4  

 

 Hemoglobin  19.8 g/dL  High  14.0-18.0  

 

 Hematocrit  61 %  High  42-52  21

 

 Mean Corpuscular Volume  94 fL    80-94  

 

 Mean Corpuscular Hemoglobin  31 pg    27-31  

 

 Mean Corpuscular HGB Conc  33 g/dL    31-36  

 

 Red Cell Distribution Width  15 %    10.5-15  

 

 Platelet Count  206 10^3/uL    150-450  

 

 Mean Platelet Volume  9 um3    7.4-10.4  

 

 Abs Neutrophils  6.2 10^3/uL    1.5-7.7  

 

 Abs Lymphocytes  1.8 10^3/uL    1.0-4.8  

 

 Abs Monocytes  1.2 10^3/uL  High  0-0.8  

 

 Abs Eosinophils  0.2 10^3/uL    0-0.6  

 

 Abs Basophils  0.1 10^3/uL    0-0.2  

 

 Abs Nucleated RBC  0.08 10^3/uL      

 

 Granulocyte %  65.2 %    38-83  

 

 Lymphocyte %  19.2 %  Low  25-47  

 

 Monocyte %  12.7 %  High  1-9  

 

 Eosinophil %  2.2 %    0-6  

 

 Basophil %  0.7 %    0-2  

 

 Nucleated Red Blood Cells %  0.9      









 Comp Metabolic Panel  2017  [facility]  Sodium  
137 mmol/L    133-145  



     [phone]          









 Potassium  4.3 mmol/L    3.5-5.0  

 

 Chloride  104 mmol/L    101-111  

 

 Co2 Carbon Dioxide  27 mmol/L    22-32  

 

 Anion Gap  6 mmol/L    2-11  

 

 Glucose  70 mg/dL      

 

 Blood Urea Nitrogen  18 mg/dL    6-24  

 

 Creatinine  0.97 mg/dL    0.67-1.17  

 

 BUN/Creatinine Ratio  18.6    8-20  

 

 Calcium  9.1 mg/dL    8.6-10.3  

 

 Total Protein  6.4 g/dL    6.4-8.9  

 

 Albumin  4.0 g/dL    3.2-5.2  

 

 Globulin  2.4 g/dL    2-4  

 

 Albumin/Globulin Ratio  1.7    1-3  

 

 Total Bilirubin  0.50 mg/dL    0.2-1.0  

 

 Alkaline Phosphatase  71 U/L      

 

 Alt  25 U/L    7-52  

 

 Ast  17 U/L    13-39  

 

 Egfr Non-  81.3    >60  

 

 Egfr   104.5    >60  22









 Laboratory test  2017  [facility]  TSH (Thyroid  
2.01 mcIU/mL    0.34-5.60  23



 finding    [phone]  Stim Horm)        









 Lyme Disease Serology  Negative    Negative  24

 

 Vitamin D, 1,25 Dihydroxy  33 pg/mL    18-64  25









 Laboratory test  05/15/2017  [facility]  Surgical  SEE 
RESULT      26, 27



 finding    [phone]  Pathology  BELOW      

 

 Comp Metabolic  2016  [facility]  Sodium  135 mmol/
L    133-1  



 Panel    [phone]        45  









 Potassium  4.5 mmol/L    3.5-5.0  

 

 Chloride  102 mmol/L    101-111  

 

 Co2 Carbon Dioxide  27 mmol/L    22-32  

 

 Anion Gap  6 mmol/L    2-11  

 

 Glucose  89 mg/dL      

 

 Blood Urea Nitrogen  10 mg/dL    6-24  

 

 Creatinine  0.89 mg/dL    0.67-1.17  

 

 BUN/Creatinine Ratio  11.2    8-20  

 

 Calcium  9.5 mg/dL    8.6-10.3  

 

 Total Protein  6.8 g/dL    6.4-8.9  

 

 Albumin  3.9 g/dL    3.2-5.2  

 

 Globulin  2.9 g/dL    2-4  

 

 Albumin/Globulin Ratio  1.3    1-3  

 

 Total Bilirubin  0.40 mg/dL    0.2-1.0  

 

 Alkaline Phosphatase  67 U/L      

 

 Alt  33 U/L    7-52  

 

 Ast  26 U/L    13-39  

 

 Egfr Non-  89.8    >60  

 

 Egfr   115.4    >60  28









 Laboratory test  2016  [facility]  Troponin-I  
0.00 ng/mL    <0.04  29



 finding    [phone]  (TnI)        

 

 CBC Auto Diff  2016  [facility]  White Blood  10.2
    3.5-10.8  



     [phone]  Count  10^3/uL      









 Red Blood Count  6.45 10^6/uL  High  4.0-5.4  

 

 Hemoglobin  19.0 g/dL  High  14.0-18.0  

 

 Hematocrit  57 %  High  42-52  

 

 Mean Corpuscular Volume  88 fL    80-94  

 

 Mean Corpuscular Hemoglobin  30 pg    27-31  

 

 Mean Corpuscular HGB Conc  34 g/dL    31-36  

 

 Red Cell Distribution Width  14 %    10.5-15  

 

 Platelet Count  216 10^3/uL    150-450  

 

 Mean Platelet Volume  8 um3    7.4-10.4  

 

 Abs Neutrophils  7.3 10^3/uL    1.5-7.7  

 

 Abs Lymphocytes  1.7 10^3/uL    1.0-4.8  

 

 Abs Monocytes  1.0 10^3/uL  High  0-0.8  

 

 Abs Eosinophils  0.1 10^3/uL    0-0.6  

 

 Abs Basophils  0.1 10^3/uL    0-0.2  

 

 Abs Nucleated RBC  0.01 10^3/uL      

 

 Granulocyte %  71.5 %    38-83  

 

 Lymphocyte %  16.4 %  Low  25-47  

 

 Monocyte %  10.0 %  High  1-9  

 

 Eosinophil %  1.3 %    0-6  

 

 Basophil %  0.8 %    0-2  

 

 Nucleated Red Blood Cells %  0      









 Laboratory test  2016  [facility]  Lactic Acid  
1.5 mmol/L    0.5-2.0  30



 finding    [phone]          









 C Reactive Protein  3.48 mg/L    < 5.00  31









 Laboratory test  2016  [facility]  Surgical 
Pathology  SEE RESULT      32



 finding    [phone]    BELOW      

 

 Lipid Profile  2015  [facility]  Triglycerides  
189 mg/dL      33



 (Trig/Chol/HDL)    [phone]          









 Cholesterol  180 mg/dL      34

 

 HDL Cholesterol  45.6 mg/dL      35

 

 LDL Cholesterol  97 mg/dL      36









 Comp Metabolic Panel  2015  [facility]  Sodium  
139 mmol/L    133-145  



     [phone]          









 Potassium  4.8 mmol/L    3.5-5.0  

 

 Chloride  102 mmol/L    101-111  

 

 Co2 Carbon Dioxide  30 mmol/L    22-32  

 

 Anion Gap  7 mmol/L    2-11  

 

 Glucose  77 mg/dL      

 

 Blood Urea Nitrogen  14 mg/dL    6-24  

 

 Creatinine  0.88 mg/dL    0.67-1.17  

 

 BUN/Creatinine Ratio  15.9    8-20  

 

 Calcium  9.7 mg/dL    8.6-10.3  

 

 Total Protein  7.0 g/dL    6.4-8.9  

 

 Albumin  4.5 g/dL    3.2-5.2  

 

 Globulin  2.5 g/dL    2-4  

 

 Albumin/Globulin Ratio  1.8    1-3  

 

 Total Bilirubin  0.40 mg/dL    0.2-1.0  

 

 Alkaline Phosphatase  65 U/L      

 

 Alt  30 U/L    7-52  

 

 Ast  19 U/L    13-39  

 

 Egfr Non-  91.3    >60  

 

 Egfr   117.4    >60  37









 Laboratory test  2015  [facility]  Hemoglobin A1c  
5.3 %    Less than  38



 finding    [phone]  (Glyco HGB)      6.0  

 

 CBC Auto Diff  2015  [facility]  White Blood  8.7 
   3.5-10.8  



     [phone]  Count  10^3/uL      









 Red Blood Count  5.76 10^6/uL  High  4.0-5.4  

 

 Hemoglobin  17.4 g/dL    14.0-18.0  

 

 Hematocrit  53 %  High  42-52  

 

 Mean Corpuscular Volume  92 fL    80-94  

 

 Mean Corpuscular Hemoglobin  30 pg    27-31  

 

 Mean Corpuscular HGB Conc  33 g/dL    31-36  

 

 Red Cell Distribution Width  14 %    10.5-15  

 

 Platelet Count  201 10^3/uL    150-450  

 

 Mean Platelet Volume  8 um3    7.4-10.4  

 

 Abs Neutrophils  5.9 10^3/uL    1.5-7.7  

 

 Abs Lymphocytes  1.8 10^3/uL    1.0-4.8  

 

 Abs Monocytes  0.7 10^3/uL    0-0.8  

 

 Abs Eosinophils  0.2 10^3/uL    0-0.6  

 

 Abs Basophils  0 10^3/uL    0-0.2  

 

 Abs Nucleated RBC  0.05 10^3/uL      

 

 Granulocyte %  67.7 %    38-83  

 

 Lymphocyte %  21.3 %  Low  25-47  

 

 Monocyte %  8.3 %    1-9  

 

 Eosinophil %  2.1 %    0-6  

 

 Basophil %  0.6 %    0-2  

 

 Nucleated Red Blood Cells %  0.6      









 Lipid Profile  2014  [facility]  Triglycerides  
119 mg/dL      39



 (Trig/Chol/HDL)    [phone]          









 Cholesterol  149 mg/dL      40

 

 HDL Cholesterol  36.3 mg/dL      41

 

 LDL Cholesterol  89 mg/dL      42









 Comp Metabolic Panel  2014  [facility]  Sodium  
136 mmol/L    133-145  



     [phone]          









 Potassium  4.3 mmol/L    3.5-5.0  

 

 Chloride  100 mmol/L  Low  101-111  

 

 Co2 Carbon Dioxide  28.0 mmol/L    22-32  

 

 Anion Gap  8.0 mmol/L    2-11  

 

 Glucose  79 mg/dL      

 

 Blood Urea Nitrogen  13 mg/dL    6-24  

 

 Creatinine  0.90 mg/dL    0.50-1.40  

 

 BUN/Creatinine Ratio  14.4    8-20  

 

 Calcium  9.5 mg/dL    8.1-9.9  

 

 Total Protein  6.4 g/dL    6.2-8.1  

 

 Albumin  4.4 g/dL    3.6-5.4  

 

 Globulin  2.0 g/dL    2-4  

 

 Albumin/Globulin Ratio  2.2    1-3  

 

 Total Bilirubin  1.0 mg/dL    0.4-1.5  

 

 Alkaline Phosphatase  72 U/L      

 

 Alt  49 U/L    14-54  

 

 Ast  23 U/L    12-42  

 

 Egfr Non-  89.7    >60  

 

 Egfr   115.3    >60  43









 Lipid Profile  2014  [facility]  Triglycerides  
181 mg/dL      



 (Trig/Chol/HDL)    [phone]          









 Cholesterol  199 mg/dL    Less than 200  

 

 HDL Cholesterol  47 mg/dL    40-60  44

 

 Cholesterol/HDL Ratio  4.2 Average    1-4.44  

 

 LDL Cholesterol  115.8  High  Less Than 100  45









 Laboratory test  2014  [facility]  Hemoglobin A1c  
5.3 %    Less than  46



 finding    [phone]        6.0  

 

 Clomipramine  2014  [facility]  Clomipramine  103 
   70 - 200  



     [phone]    ng/ml      









 Desmethylclomipramine  203 ng/ml    150 - 300  47









 Basic Metabolic  01/10/2014  [facility]  Sodium  139 mmol
/L    133-145  



 Panel    [phone]          









 Potassium  4.6 mmol/L    3.5-5.0  

 

 Chloride  103 mmol/L    101-111  

 

 Co2 Carbon Dioxide  27.0 mmol/L    22-32  

 

 Anion Gap  9.0 mmol/L    2-11  

 

 Glucose  63 mg/dL  Low    

 

 Blood Urea Nitrogen  16 mg/dL    6-24  

 

 Creatinine  0.80 mg/dL    0.50-1.40  

 

 BUN/Creatinine Ratio  20.0    8-20  

 

 Calcium  9.5 mg/dL    8.1-9.9  

 

 Egfr Non-  102.7    >60  

 

 Egfr   132.1    >60  48









 CBC Auto Diff  01/10/2014  [facility]  White Blood  10.7 
10^3/uL    4.8-10.8  



     [phone]  Count        









 Red Blood Count  6.05 10^6/uL  High  4.0-5.4  

 

 Hemoglobin  18.5 g/dL  High  14.0-18.0  

 

 Hematocrit  54 %  High  42-52  

 

 Mean Corpuscular Volume  88 fL    80-94  

 

 Mean Corpuscular Hemoglobin  31 pg    27-31  

 

 Mean Corpuscular HGB Conc  35 g/dL    31-36  

 

 Red Cell Distribution Width  14 %    10.5-15  

 

 Platelet Count  210 10^3/uL    150-450  

 

 Mean Platelet Volume  9 um3    7.4-10.4  

 

 Abs Neutrophils  7.0 10^3/uL    1.5-7.7  

 

 Abs Lymphocytes  2.4 10^3/uL    1.0-4.8  

 

 Abs Monocytes  1.2 10^3/uL  High  0-0.8  

 

 Abs Eosinophils  0.1 10^3/uL    0-0.6  

 

 Abs Basophils  0.1 10^3/uL    0-0.2  

 

 Abs Nucleated RBC  0.01 10^3/uL      









 Statin  01/10/2014  [facility]  Ast  22 U/L    12-42  



     [phone]          









 Alt  47 U/L    14-54  









 Lipid Profile  01/10/2014  [facility]  Triglycerides  
162 mg/dL      



 (Trig/Chol/HDL)    [phone]          









 Cholesterol  184 mg/dL    Less than 200  

 

 HDL Cholesterol  45 mg/dL    40-60  49

 

 Cholesterol/HDL Ratio  4.1 Average    1-4.44  

 

 LDL Cholesterol  106.6  High  Less Than 100  50









 Manual Differential  01/10/2014  [facility]  Neutrophil 
%  73 %    38-83  



     [phone]          









 Band %  1 %    0-8  

 

 Lymphocytes %  17 %  Low  25-47  

 

 Monocytes %  9 %    0-13  

 

 RBC Morphology  Normal    Normal  









 Laboratory test  10/09/2013  [facility] House [facility]  Strep Screen  neg    Neg  



 finding    [phone]   [phone]          

 

 CBC Auto Diff  2013  [facility]  White Blood  9.5 
10^3/uL    4.8-10.8  



     [phone]  Count        









 Red Blood Count  6.26 10^6/uL  High  4.0-5.4  

 

 Hemoglobin  18.2 g/dL  High  14.0-18.0  

 

 Hematocrit  57 %  High  42-52  

 

 Mean Corpuscular Volume  91 fL    80-94  

 

 Mean Corpuscular Hemoglobin  29 pg    27-31  

 

 Mean Corpuscular HGB Conc  32 g/dL    31-36  

 

 Red Cell Distribution Width  14 %    10.5-15  

 

 Platelet Count  220 10^3/uL    150-450  

 

 Mean Platelet Volume  9 um3    7.4-10.4  

 

 Abs Neutrophils  6.0 10^3/uL    1.5-7.7  

 

 Abs Lymphocytes  2.3 10^3/uL    1.0-4.8  

 

 Abs Monocytes  1.0 10^3/uL  High  0-0.8  

 

 Abs Eosinophils  0.2 10^3/uL    0-0.6  

 

 Abs Basophils  0.1 10^3/uL    0-0.2  

 

 Abs Nucleated RBC  0 10^3/uL      









 Basic Metabolic  2013  [facility]  Sodium  136 mmol
/L    133-145  



 Panel    [phone]          









 Potassium  4.5 mmol/L    3.5-5.0  

 

 Chloride  105 mmol/L    101-111  

 

 Co2 Carbon Dioxide  24.0 mmol/L    22-32  

 

 Anion Gap  7.0 mmol/L    2-11  

 

 Glucose  82 mg/dL      

 

 Blood Urea Nitrogen  16 mg/dL    6-24  

 

 Creatinine  0.80 mg/dL    0.50-1.40  

 

 BUN/Creatinine Ratio  20.0    8-20  

 

 Calcium  9.3 mg/dL    8.1-9.9  

 

 Egfr Non-  103.2    >60  

 

 Egfr   132.7    >60  51









 Lipid Profile  2013  [facility]  Triglycerides  
125 mg/dL      



 (Trig/Chol/HDL)    [phone]          









 Cholesterol  167 mg/dL    Less than 200  

 

 HDL Cholesterol  36 mg/dL  Low  40-60  52

 

 Cholesterol/HDL Ratio  4.6 Average  High  1-4.44  

 

 LDL Cholesterol  106.0  High  Less Than 100  53









 Statin  2013  [facility]  Ast  24 U/L    12-42  



     [phone]          









 Alt  48 U/L    14-54  









 Manual Differential  2013  [facility]  Neutrophil 
%  54 %    38-83  



     [phone]          









 Lymphocytes %  35 %    25-47  

 

 Monocytes %  5 %    0-13  

 

 Eosinophils %  1 %    0-6  

 

 Basophil %  1 %    0-2  

 

 Reactive Lymph %  4 %    0-6  

 

 RBC Morphology  Normal    Normal  









 Basic Metabolic  2013  [facility]  Sodium  139 mmol
/L    133-145  



 Panel    [phone]          









 Potassium  4.6 mmol/L    3.5-5.0  

 

 Chloride  104 mmol/L    101-111  

 

 Co2 Carbon Dioxide  28.0 mmol/L    22-32  

 

 Anion Gap  7.0 mmol/L    2-11  

 

 Glucose  86 mg/dL      

 

 Blood Urea Nitrogen  15 mg/dL    6-24  

 

 Creatinine  0.90 mg/dL    0.50-1.40  

 

 BUN/Creatinine Ratio  16.7    8-20  

 

 Calcium  9.8 mg/dL    8.1-9.9  

 

 Egfr Non-  90.1    >60  

 

 Egfr   115.8    >60  54









 Statin  2013  [facility]  Ast  23 U/L    12-42  



     [phone]          









 Alt  47 U/L    14-54  









 Lipid Profile  2013  [facility]  Triglycerides  
134 mg/dL      



 (Trig/Chol/HDL)    [phone]          









 Cholesterol  158 mg/dL    Less than 200  

 

 HDL Cholesterol  35 mg/dL  Low  40-60  55

 

 Cholesterol/HDL Ratio  4.5 Average  High  1-4.44  

 

 LDL Cholesterol  96.2 mg/dL    Less Than 100  56









 1  [id number]

 

 2  [id number]

 

 3  Desirable: <150



   Borderline High: 150-199



   High: 200-499



   Very High: >500

 

 4  Desirable: <200



   Borderline High: 200-239



   High: >239

 

 5  Low: <40



   Desirable: 40-60



   High: >60

 

 6  Desirable: <100



   Near Optimal: 100-129



   Borderline High: 130-159



   High: 160-189



   Very High: >189

 

 7  [id number]

 

 8  *******Because ethnic data is not always readily available,



   this report includes an eGFR for both -Americans and



   non- Americans.****



   The National Kidney Disease Education Program (NKDEP) does



   not endorse the use of the MDRD equation for patients that



   are not between the ages of 18 and 70, are pregnant, have



   extremes of body size, muscle mass, or nutritional status,



   or are non- or non-.



   According to the National Kidney Foundation, irrespective of



   diagnosis, the stage of the disease is based on the level of



   kidney function:



   Stage Description                      GFR(mL/min/1.73 m(2))



   1     Kidney damage with normal or decreased GFR       90



   2     Kidney damage with mild decrease in GFR          60-89



   3     Moderate decrease in GFR                         30-59



   4     Severe decrease in GFR                           15-29



   5     Kidney failure                       <15 (or dialysis)

 

 9  Acute inflammation:  >10.00

 

 10  [facility] Severe Sepsis and Septic Shock Management Bundle Measure



   requires all lactic acids initially measuring >2.0 mmol/L be



   repeated.

 

 11  SEE RESULT BELOW



   -----------------------------------------------------------------------------
---------------



   Name:  HIPOLITO JAMES                 : 1965    Attend Dr: Pito Geiger MD



   Acct:  [id number]  Unit: [id number]  AGE: 52            Location:  [facility]



   Re/15/18                        SEX: M             Status:    DEP ER



   -----------------------------------------------------------------------------
---------------



   



   SPEC: [id number]         YASMEEN:   03/15/    [facility] DR: Pito Geiger MD



   REQ:  [id number]              RECD:   03/15/



   STATUS: TRUE HANLEY DR: Ginette Littlejohn NP



   _



   SOURCE: NO SOURCE      SPDESC:



   ORDERED:  Blood Cult



   



   -----------------------------------------------------------------------------
---------------



   Procedure                         Result                         Reported   
        Site



   -----------------------------------------------------------------------------
---------------



   Aerobic Culture Bottle  Final                                    339      ML



   No Growth Day 5



   



   Anaerobic Culture Bottle  Final                                  339      ML



   No Growth Day 5



   



   -----------------------------------------------------------------------------
---------------



   * ML - Main Lab



   .



   



   



   



   



   



   



   



   



   



   



   



   



   



   



   



   



   



   



   



   



   



   



   



   



   ** END OF REPORT **



   



   DEPARTMENT OF PATHOLOGY,  [address] [place] [address]



   Phone # [phone]      Fax #[phone]



   Liam Hall M.D. Director     [facility] # [id number]

 

 12  Test Performed by:



   [address] [address]

 

 13  [id number]

 

 14  Acute inflammation:  >10.00

 

 15  No bands detected

 

 16  No bands detected

 

 17  Specific serologic response to B. burgdorferi infection is



   not detected, but cannot rule out early infection during



   which low or undetectable antibody levels to B. burgdorferi



   may be present.  If clinically indicated, a new serum



   specimen should be submitted in 7-14 days.



   -------------------ADDITIONAL INFORMATION-------------------



   CDC criteria require >=5 bands for IgG or >=2 bands for IgM



   for the Immunoblot to be considered positive. Bands



   (e.g.,p41) may be detected in patients without Lyme



   disease, and patterns not meeting the CDC criteria should



   be interpreted with caution.



   Immunoblot should be ordered only on specimens that are



   positive or equivocal by a FDA-licensed Lyme disease



   antibody screening test (e.g., EIA).



   Test Performed by:



   [facility] - [address] [address]

 

 18  -------------------ADDITIONAL INFORMATION-------------------



   This test was developed and its performance characteristics



   determined by [facility] in a manner consistent with CLIA



   requirements. This test has not been cleared or approved by



   the U.S. Food and Drug Administration.

 

 19  -------------------ADDITIONAL INFORMATION-------------------



   This test was developed and its performance characteristics



   determined by [facility] in a manner consistent with CLIA



   requirements. This test has not been cleared or approved by



   the U.S. Food and Drug Administration.

 

 20  -------------------ADDITIONAL INFORMATION-------------------



   This test was developed and its performance characteristics



   determined by [facility] in a manner consistent with CLIA



   requirements. This test has not been cleared or approved by



   the U.S. Food and Drug Administration.



   Test Performed by:



   [facility] - [address] [address]

 

 21  Consistent with previous results on 16.

 

 22  *******Because ethnic data is not always readily available,



   this report includes an eGFR for both -Americans and



   non- Americans.****



   The National Kidney Disease Education Program (NKDEP) does



   not endorse the use of the MDRD equation for patients that



   are not between the ages of 18 and 70, are pregnant, have



   extremes of body size, muscle mass, or nutritional status,



   or are non- or non-.



   According to the [employer], irrespective of



   diagnosis, the stage of the disease is based on the level of



   kidney function:



   Stage Description                      GFR(mL/min/1.73 m(2))



   1     Kidney damage with normal or decreased GFR       90



   2     Kidney damage with mild decrease in GFR          60-89



   3     Moderate decrease in GFR                         30-59



   4     Severe decrease in GFR                           15-29



   5     Kidney failure                       <15 (or dialysis)

 

 23  [id number]

 

 24  Serologic response to B. burgdorferi infection is not



   detected, but cannot rule out early infection during



   which low or undetectable antibody levels to



   B. burgdorferi may be present. If clinically indicated,



   a new serum specimen should be submitted in 7-14 days.



   Test Performed by:



   [facility] - [address] [address]

 

 25  -------------------ADDITIONAL INFORMATION-------------------



   This test was developed and its performance characteristics



   determined by [facility] in a manner consistent with CLIA



   requirements. This test has not been cleared or approved by



   the U.S. Food and Drug Administration.



   Test Performed by:



   [facility] - [address] [address]

 

 26  Referred to dermatology

 

 27  SEE RESULT BELOW



   -----------------------------------------------------------------------------
---------------



   Name:  HIPOLITO JAMES                 : 1965    Attend Dr: Abram Clark MD



   Acct:  [id number]  Unit: [id number]  AGE: 51            Location:  [facility]



   Re/15/17                        SEX: M             Status:    REG REF



   -----------------------------------------------------------------------------
---------------



   



   SPEC: [id number]             YASMEEN: 05/15/      [facility] DR: Abram Clark MD



   REQ:  [id number]             RECD: 05/15/



   STATUS: SOUT



   _



   ORDERED:  LEVEL 4



   COMMENTS: [id number]



   



   FINAL DIAGNOSIS



   



   



   Skin, right cheek, biopsy:



   -- Basal cell carcinoma, superficial and nodular type, ulcerated.



   -- Lesional cells extend to one lateral specimen edge.



   



   



   



   CLINICAL HISTORY



   



   No history given



   



   GROSS DESCRIPTION



   



   The specimen is received in formalin labeled, Right Cheek Punch Biopsy, and 
consists of a



   0.6 cm tan-brown bosselated hairbearing skin punch excised to a depth of 0.4 
cm which is



   bisected and entirely submitted in one cassette.



   



   Signed __________(signature on file)___________ Joyce Hall MD  [id number]



   



   -----------------------------------------------------------------------------
---------------



   



   



   



   



   



   



   



   



   



   



   



   



   



   



   ** END OF REPORT **



   



   * ML=Testing performed at Main Lab



   DEPARTMENT OF PATHOLOGY,  [address] [place] [address]



   Phone # [phone]      Fax #[phone]



   Liam Hall M.D. Director     [facility] # [id number]

 

 28  *******Because ethnic data is not always readily available,



   this report includes an eGFR for both -Americans and



   non- Americans.****



   The National Kidney Disease Education Program (NKDEP) does



   not endorse the use of the MDRD equation for patients that



   are not between the ages of 18 and 70, are pregnant, have



   extremes of body size, muscle mass, or nutritional status,



   or are non- or non-.



   According to the National Kidney Foundation, irrespective of



   diagnosis, the stage of the disease is based on the level of



   kidney function:



   Stage Description                      GFR(mL/min/1.73 m(2))



   1     Kidney damage with normal or decreased GFR       90



   2     Kidney damage with mild decrease in GFR          60-89



   3     Moderate decrease in GFR                         30-59



   4     Severe decrease in GFR                           15-29



   5     Kidney failure                       <15 (or dialysis)

 

 29  NOTE: Critical Troponin is now >0.03 ng/mL.



   99th percentile=0.04 ng/mL



   



   Troponin results at [facility] and [facility] are not interchangeable.

 

 30  [facility] Severe Sepsis and Septic Shock Management Bundle Measure



   requires all lactic acids initially measuring >2.0 mmol/L be



   repeated.

 

 31  Acute inflammation:  >10.00

 

 32  SEE RESULT BELOW



   -----------------------------------------------------------------------------
---------------



   Name:  HIPOLITO JAMES                 : 1965    Attend Dr: Abram Clark MD



   Acct:  [id number]  Unit: [id number]  AGE: 50            Location:  [facility]



   Re16                        SEX: M             Status:    REG REF



   -----------------------------------------------------------------------------
---------------



   



   SPEC: [id number]             YASMEEN:       SUBM DR: Abram Clark MD



   REQ:  [id number]             RECD: 



   STATUS: SOUT



   _



   ORDERED:  LEVEL III



   



   FINAL DIAGNOSIS



   



   



   Skin, site unspecified, excision:



   -- Epidermal inclusion cyst.



   



   



   



   CLINICAL HISTORY



   



   Present several months, is recurrent.



   



   PRE-OPERATIVE DIAGNOSIS



   



   Sebaceous cyst



   



   GROSS DESCRIPTION



   



   The specimen is received in formalin with no source identified, with a 
requisition labeled,



   Sebaceous Cyst, and consists of a 0.8 x 0.5 by 0.3 cm white indurated soft 
tissue fragment.



   The cut surface is a heterogeneous yellow-white mixture.  The specimen is 
inked, trisected



   and entirely submitted in one cassette.



   



   Signed __________(signature on file)___________ Liam Hall MD  [id number]



   



   -----------------------------------------------------------------------------
---------------



   



   



   



   



   



   



   



   



   



   



   



   ** END OF REPORT **



   



   * ML=Testing performed at Main Lab



   DEPARTMENT OF PATHOLOGY,  [address] [place] [address]



   Phone # [phone]      Fax #[phone]



   Liam Hall M.D. Director     [facility] # [id number]

 

 33  Desirable <150



   Borderline high 150-199



   High 200-499



   Very High >500

 

 34  Desirable <200



   Borderline high 200-239



   High >239

 

 35  Low <40



   Desirable: 40-60



   High: >60

 

 36  Desirable: <100 mg/dL



   Near Optimal: 100-129 mg/dL



   Borderline High: 130-159 mg/dL



   High: 160-189 mg/dL



   Very High: >189 mg/dL

 

 37  *******Because ethnic data is not always readily available,



   this report includes an eGFR for both -Americans and



   non- Americans.****



   The National Kidney Disease Education Program (NKDEP) does



   not endorse the use of the MDRD equation for patients that



   are not between the ages of 18 and 70, are pregnant, have



   extremes of body size, muscle mass, or nutritional status,



   or are non- or non-.



   According to the National Kidney Foundation, irrespective of



   diagnosis, the stage of the disease is based on the level of



   kidney function:



   Stage Description                      GFR(mL/min/1.73 m(2))



   1     Kidney damage with normal or decreased GFR       90



   2     Kidney damage with mild decrease in GFR          60-89



   3     Moderate decrease in GFR                         30-59



   4     Severe decrease in GFR                           15-29



   5     Kidney failure                       <15 (or dialysis)

 

 38  Therapeutic target for the treatment of diabetes



   [facility] patients is <7% HBA1C, and in selective



   patients <6.0%.Please refer to American Diabetes



   Association Diabetic care guidelines for further



   information.

 

 39  Desirable <150



   Borderline high 150-199



   High 200-499



   Very High >500

 

 40  Desirable <200



   Borderline high 200-239



   High >239

 

 41  Low <40



   Desirable: 40-60



   High: >60

 

 42  Desirable <100



   Near Optimal 100-129



   Borderline high 130-159



   High 160-189



   Very High >189

 

 43  *******Because ethnic data is not always readily available,



   this report includes an eGFR for both -Americans and



   non- Americans.****



   The National Kidney Disease Education Program (NKDEP) does



   not endorse the use of the MDRD equation for patients that



   are not between the ages of 18 and 70, are pregnant, have



   extremes of body size, muscle mass, or nutritional status,



   or are non- or non-.



   According to the National Kidney Foundation, irrespective of



   diagnosis, the stage of the disease is based on the level of



   kidney function:



   Stage Description                      GFR(mL/min/1.73 m(2))



   1     Kidney damage with normal or decreased GFR       90



   2     Kidney damage with mild decrease in GFR          60-89



   3     Moderate decrease in GFR                         30-59



   4     Severe decrease in GFR                           15-29



   5     Kidney failure                       <15 (or dialysis)

 

 44  HDL Interpretation:



   Undesirable: High Risk:  Less than 40 mg/dL



   Desirable:  Low Risk:  Greater than 60 mg/dL

 

 45  LDL Interpretation:



   Low Risk Optimal Level:  LDL Less than 100 mg/dL



   Near or Above Optimal:  -129 mg/dL



   Borderline High Risk:  -159 mg/dL



   High Risk:  -189 mg/dL



   Very High Risk:  LDL Greater than 189 mg/dL

 

 46  Therapeutic target for the treatment of diabetes



   [facility] patients is <7% HBA1C, and in selective



   patients <6.0%.Please refer to American Diabetes



   Association Diabetic care guidelines for further



   information.

 

 47  Desired clomipramine concentrations for the treatment



   of chronic pain:  20 - 85 ng/ml.



   Test Performed by:



   [employer].



   [address] [place] [id number]

 

 48  *******Because ethnic data is not always readily available,



   this report includes an eGFR for both -Americans and



   non- Americans.****



   The National Kidney Disease Education Program (NKDEP) does



   not endorse the use of the MDRD equation for patients that



   are not between the ages of 18 and 70, are pregnant, have



   extremes of body size, muscle mass, or nutritional status,



   or are non- or non-.



   According to the National Kidney Foundation, irrespective of



   diagnosis, the stage of the disease is based on the level of



   kidney function:



   Stage Description                      GFR(mL/min/1.73 m(2))



   1     Kidney damage with normal or decreased GFR       90



   2     Kidney damage with mild decrease in GFR          60-89



   3     Moderate decrease in GFR                         30-59



   4     Severe decrease in GFR                           15-29



   5     Kidney failure                       <15 (or dialysis)

 

 49  HDL Interpretation:



   Undesirable: High Risk:  Less than 40 mg/dL



   Desirable:  Low Risk:  Greater than 60 mg/dL

 

 50  LDL Interpretation:



   Low Risk Optimal Level:  LDL Less than 100 mg/dL



   Near or Above Optimal:  -129 mg/dL



   Borderline High Risk:  -159 mg/dL



   High Risk:  -189 mg/dL



   Very High Risk:  LDL Greater than 189 mg/dL

 

 51  *******Because ethnic data is not always readily available,



   this report includes an eGFR for both -Americans and



   non- Americans.****



   The National Kidney Disease Education Program (NKDEP) does



   not endorse the use of the MDRD equation for patients that



   are not between the ages of 18 and 70, are pregnant, have



   extremes of body size, muscle mass, or nutritional status,



   or are non- or non-.



   According to the National Kidney Foundation, irrespective of



   diagnosis, the stage of the disease is based on the level of



   kidney function:



   Stage Description                      GFR(mL/min/1.73 m(2))



   1     Kidney damage with normal or decreased GFR       90



   2     Kidney damage with mild decrease in GFR          60-89



   3     Moderate decrease in GFR                         30-59



   4     Severe decrease in GFR                           15-29



   5     Kidney failure                       <15 (or dialysis)

 

 52  HDL Interpretation:



   Undesirable: High Risk:  Less than 40 mg/dL



   Desirable:  Low Risk:  Greater than 60 mg/dL

 

 53  LDL Interpretation:



   Low Risk Optimal Level:  LDL Less than 100 mg/dL



   Near or Above Optimal:  -129 mg/dL



   Borderline High Risk:  -159 mg/dL



   High Risk:  -189 mg/dL



   Very High Risk:  LDL Greater than 189 mg/dL

 

 54  *******Because ethnic data is not always readily available,



   this report includes an eGFR for both -Americans and



   non- Americans.****



   The National Kidney Disease Education Program (NKDEP) does



   not endorse the use of the MDRD equation for patients that



   are not between the ages of 18 and 70, are pregnant, have



   extremes of body size, muscle mass, or nutritional status,



   or are non- or non-.



   According to the National Kidney Foundation, irrespective of



   diagnosis, the stage of the disease is based on the level of



   kidney function:



   Stage Description                      GFR(mL/min/1.73 m(2))



   1     Kidney damage with normal or decreased GFR       90



   2     Kidney damage with mild decrease in GFR          60-89



   3     Moderate decrease in GFR                         30-59



   4     Severe decrease in GFR                           15-29



   5     Kidney failure                       <15 (or dialysis)

 

 55  HDL Interpretation:



   Undesirable: High Risk:  Less than 40 MG/DL



   Desirable:  Low Risk:  Greater than 60 MG/DL

 

 56  LDL Interpretation:



   Low Risk Optimal Level:  LDL Less than 100 MG/DL



   Near or Above Optimal:  -129 MG/DL



   Borderline High Risk:  -159 MG/DL



   High Risk:  -189 MG/DL



   Very High Risk:  LDL Greater than 189 MG/DL







Procedures







 Date  Code  Description  Status

 

 2018  77976  EKG, at Least 12 Leads w/Interpretation and Report  
Completed

 

 05/15/2017  16344  Destruction,Premalignant Lesion,1St Lesion  Completed

 

 05/15/2017  83738  BX Of Skin,Tissue, Mucous Membran  Completed

 

 2016  72529  EKG, at Least 12 Leads w/Interpretation and Report  
Completed

 

 2016  41194  Excision Of Lesion (Trunk,Arm,Leg) .6 To 1 CM.  Completed

 

 2014  12789  Incision Of Thrombosed Hemorrhoid,External  Completed

 

 2014  98423  EKG, at Least 12 Leads w/Interpretation and Report  
Completed







Encounters







 Type  Date  Location  Provider  Dx  Diagnosis

 

 Office Visit  2018  Main Office  Abram Clark,  S64.02xA  Injury of 
ulnar



   11:45a    MD    nerve at [facility]/hnd lv



           of left arm, init

 

 Office Visit  2018  Main Office  Abram Clark,  K13.79  Other 
lesions of



   9:30a    MD    oral mucosa









 Z01.818  Encounter for other preprocedural examination

 

 L02.31  Cutaneous abscess of buttock









 Office Visit  2018  5:15p  Main Office  Abram Clark  B37.2  
Candidiasis of



       MD    skin and nail

 

 Office Visit  2018  1:45p  Main Office  Abram Clark  K13.79  Other 
lesions of



       MD    oral mucosa









 L30.9  Dermatitis, unspecified









 Office Visit  2018  4:30p  Main Office  Nilda AGUDELO64.9  Unspecified



       Storm, FNP-C    hemorrhoids

 

 Office Visit  2018  2:00p  Main Office  Abram  M79.671  Pain in right 
foot



       MD Amber    

 

 Office Visit  2017  1:00p  Main Office  Abram  L30.4  Erythema 
intertrigo



       MD Amber    

 

 Office Visit  2017 11:30a  Main Office  Ginette Littlejohn,  M86.271  
Subacute



       FNP-C    osteomyelitis,



           right ankle and



           foot









 R71.8  Other abnormality of red blood cells









 Office Visit  2017  3:00p  Main Office  Ginette Littlejohn  M79.671  Pain 
in right



       FNP-C    foot









 E07.9  Disorder of thyroid, unspecified









 Office Visit  2017  9:45a  Main Office  Ginette Littlejohn  M79.671  Pain 
in right



       FNP-C    foot

 

 Office Visit  2017  8:45a  Main Office  Dominick Lancaster  R05  Cough



       III, FNP-C    

 

 Office Visit  05/15/2017  2:15p  Main Office  Abram Clark,  D48.5  
Neoplasm of



       MD    uncertain



           behavior of skin









 L57.0  Actinic keratosis

 

 S63.610A  Unspecified sprain of right index finger, initial encounter









 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 
fascial



       FNP-C    fibromatosis

 

 Office Visit  2017  9:15a  Main Office  Ginette Littlejohn,  R23.8  Other 
skin changes



       FNP-C    

 

 Office Visit  2017 10:45a  Main Office  Ginette Littlejohn,  L84  Corns and



       FNP-C    callosities

 

 Office Visit  2017  3:45p  Main Office  Dominick Lancaster  H65.02  Acute 
serous otitis



       III, FNP-C    media, left ear

 

 Office Visit  2017  3:00p  Main Office  Ginette Littlejohn,  L05.91  
Pilonidal cyst



       FNP-C    without abscess









 K12.1  Other forms of stomatitis









 Office Visit  2016  9:15a  Main Office  Ginette Littlejohn,  J30.89  Other 
allergic



       FNP-C    rhinitis

 

 Office Visit  2016 11:15a  Main Office  Ginette Littlejohn,  S40.861A  
Insect bite



       FNP-C    (nonvenomous) of



           right upper arm,



           init encntr

 

 Office Visit  10/25/2016  9:00a  Main Office  Dominick Lancaster  L30.9  Dermatitis
,



       III, FNP-C    unspecified

 

 Office Visit  2016 11:30a  Main Office  Dominick Lancaster  K12.2  
Cellulitis and



       III, FNP-C    abscess of mouth

 

 Office Visit  2016  9:00a  Main Office  Dominick Lancaster  L08.9  Local 
infection of



       III, FNP-C    the skin and



           subcutaneous



           tissue, unsp

 

 Office Visit  2016  9:30a  Main Office  Ginette Littlejohn,  Z00.00  Encntr 
for general



       FNP-C    adult medical exam



           w/o abnormal



           findings

 

 Office Visit  2016  2:30p  Main Office  Abram  L30.9  DermatitisAmber MD    unspecified

 

 Office Visit  2015 11:15a  Main Office  Ginette Littlejohn,  I10  Essential



       FNP-C    (primary)



           hypertension









 E78.5  Hyperlipidemia, unspecified

 

 R46.81  Obsessive-compulsive behavior

 

 M54.5  Low back pain









 Office Visit  2015  4:15p  Main Office  Abram  M54.5  Low back pain



       MD Amber    

 

 Office Visit  2015  4:45p  Main Office  Nilda MOYER  J30.9  Allergic 
rhinitis,



       Storm, FNP-C    unspecified

 

 Office Visit  2014 12:15p  Main Office  Ginette Littlejohn,  110.9  
Dermatophytosis



       FNP-C    Unspec Site

 

 Office Visit  10/03/2014 10:30a  Main Office  Ginette Littlejohn,  455.4  
Hemorrhoids External



       FNP-C    Thrombosed

 

 Office Visit  2014  9:15a  Main Office  Ginette Littlejohn,  401.9  
Hypertension Unspec



       FNP-C    

 

 Office Visit  2014  9:00a  Main Office  Ginette Littlejohn,  401.9  
Hypertension Unspec



       FNP-C    









 272.4  Hyperlipidemia Other Unspec









 Office Visit  2014  9:30a  Main Office  Ginette Littlejohn,  455.3  
Hemorrhoids



       FNP-C    External W/O



           Complication

 

 Office Visit  01/10/2014  8:45a  Main Office  Ginette Littlejohn,  401.9  
Hypertension Unspec



       FNP-C    









 272.4  Hyperlipidemia Other Unspec

 

 V04.81  Need For Prophylactic Vaccination & Inoculation/Influenza









 Office Visit  2013 12:00p  Main Office  Nilda MOYER  682.8  Cellulitis & 
Abscess



       Storm, FNP-C    Other Spec Sites

 

 Office Visit  2013 10:00a  Main Office  Ginette Littlejohn,  307.49  Sleep 
Disorder Other



       FNP-C    

 

 Office Visit  10/09/2013 11:30a  Main Office  Ginette Littlejohn,  786.2  Cough



       FNP-C    

 

 Office Visit  2013  9:00a  Main Office  Ginette Littlejohn,  401.9  
Hypertension Unspec



       FNP-C    









 272.4  Hyperlipidemia Other Unspec









 Office Visit  2013 12:15p  Main Office  Hipolito Cotter,  110.3  
Dermatophytosis Groin



       M.DNanci    & Perianal Area

 

 Office Visit  2013  9:00a  Main Office  Ginette  401.9  Hypertension Unspec



       Ramona,    



       FNP-C    









 786.2  Cough









 Office Visit  2013 10:00a  Main Office  Ginette Littlejohn FNP-C  786.2  
Cough









 401.9  Hypertension Unspec









 Office Visit  2013 11:15a  Main Office  Nilda MOYER  455.0  Hemorrhoids 
Internal



       Storm, FNP-C    W/O Complication









 564.00  Constipation Unspecified









 Office Visit  2013 10:00a  Main Office  Ginette Littlejohn, FNP-C  786.2  
Cough







Plan of Treatment

2018 - Abram Clark, MDZ00.00 Encounter for general adult medical 
examination without abnoF32.9 Major depressive disorder, single episode, 
unspecifiedFollow up:Records from Dr. Lorenz03.9 Hypothyroidism, 
unspecifiedComments:Recheck labsE78.5 Hyperlipidemia, unspecifiedComments:LDL 
is well controlled on his current dosage of statin. We'll continue at this 
level.K13.79 Other lesions of oral mucosaComments:He had his tongue biopsies. 
He thinks nothing else has to be done, but doesn't remember any of the 
details.Follow up:Records from tongue surgery/biopsy results

## 2018-09-22 NOTE — RAD
INDICATION:  The patient's left thumb was shut in a car door. Now with ecchymosis at the

nail bed.



COMPARISON: None



TECHNIQUE: 3 views of the left thumb were obtained.



FINDINGS: The bones are normal alignment. Joint spaces appear maintained. No fracture is

seen.



IMPRESSION:  No acute fracture or dislocation. 







If the patient's symptoms persist, follow-up imaging is recommended.



R0

## 2019-03-25 ENCOUNTER — HOSPITAL ENCOUNTER (OUTPATIENT)
Dept: HOSPITAL 25 - OR | Age: 54
Discharge: HOME | End: 2019-03-25
Attending: ORTHOPAEDIC SURGERY
Payer: MEDICARE

## 2019-03-25 VITALS — SYSTOLIC BLOOD PRESSURE: 135 MMHG | DIASTOLIC BLOOD PRESSURE: 99 MMHG
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
## 2019-03-25 DIAGNOSIS — T84.84XA: Primary | ICD-10-CM

## 2019-03-25 DIAGNOSIS — E03.9: ICD-10-CM

## 2019-03-25 DIAGNOSIS — J45.909: ICD-10-CM

## 2019-03-25 DIAGNOSIS — M84.374D: ICD-10-CM

## 2019-03-25 DIAGNOSIS — I10: ICD-10-CM

## 2019-03-25 DIAGNOSIS — R60.0: ICD-10-CM

## 2019-03-25 PROCEDURE — 88300 SURGICAL PATH GROSS: CPT

## 2019-03-25 NOTE — OP
DATE OF OPERATION:  03/25/19 - [facility]

 

DATE OF BIRTH:  [birth year]

 

SURGEON:  Fredy Martinez MD

 

ASSISTANT:  Fabiana Etienne PA-C

 

PRE-OP DIAGNOSIS:  Painful loose hardware, right midfoot.

 

POST-OP DIAGNOSIS:  Painful loose hardware, right midfoot.

 

OPERATIVE PROCEDURE:  Removal of hardware, right midfoot.

 

DESCRIPTION OF PROCEDURE:  The patient was taken to the operating room where a 
2 cm incision was made over the dorsum of the right midfoot.  We spread the 
soft tissues to allow visualization of the head of the small locking screw, 
which was removed with the needle .  We then irrigated the wound, closing 
with 3-0 Monocryl and nylon for the skin and a compression dressing applied.

 

 [id number]/CPS #: [id number]

[facility] CenterRAVEN

## 2019-03-27 ENCOUNTER — HOSPITAL ENCOUNTER (OUTPATIENT)
Dept: HOSPITAL 25 - ED | Age: 54
Setting detail: OBSERVATION
LOS: 1 days | Discharge: HOME | End: 2019-03-28
Attending: INTERNAL MEDICINE | Admitting: HOSPITALIST
Payer: MEDICARE

## 2019-03-27 DIAGNOSIS — E78.5: ICD-10-CM

## 2019-03-27 DIAGNOSIS — E66.01: ICD-10-CM

## 2019-03-27 DIAGNOSIS — M81.0: ICD-10-CM

## 2019-03-27 DIAGNOSIS — R46.81: ICD-10-CM

## 2019-03-27 DIAGNOSIS — J06.9: Primary | ICD-10-CM

## 2019-03-27 DIAGNOSIS — I10: ICD-10-CM

## 2019-03-27 DIAGNOSIS — E03.9: ICD-10-CM

## 2019-03-27 DIAGNOSIS — R21: ICD-10-CM

## 2019-03-27 DIAGNOSIS — R05: ICD-10-CM

## 2019-03-27 DIAGNOSIS — F41.9: ICD-10-CM

## 2019-03-27 LAB
ALBUMIN SERPL BCG-MCNC: 4.8 G/DL (ref 3.2–5.2)
ALBUMIN/GLOB SERPL: 1.5 {RATIO} (ref 1–3)
ALP SERPL-CCNC: 109 U/L (ref 34–104)
ALT SERPL W P-5'-P-CCNC: 22 U/L (ref 7–52)
ANION GAP SERPL CALC-SCNC: 13 MMOL/L (ref 2–11)
AST SERPL-CCNC: (no result) U/L (ref 13–39)
BASOPHILS # BLD AUTO: 0.1 10^3/UL (ref 0–0.2)
BUN SERPL-MCNC: 14 MG/DL (ref 6–24)
BUN/CREAT SERPL: 14.3 (ref 8–20)
CALCIUM SERPL-MCNC: 9.5 MG/DL (ref 8.6–10.3)
CHLORIDE SERPL-SCNC: 99 MMOL/L (ref 101–111)
EOSINOPHIL # BLD AUTO: 0.2 10^3/UL (ref 0–0.6)
FLUAV RNA SPEC QL NAA+PROBE: NEGATIVE
FLUBV RNA SPEC QL NAA+PROBE: NEGATIVE
GLOBULIN SER CALC-MCNC: 3.3 G/DL (ref 2–4)
GLUCOSE SERPL-MCNC: 82 MG/DL (ref 70–100)
HCO3 SERPL-SCNC: 27 MMOL/L (ref 22–32)
HCT VFR BLD AUTO: 49 % (ref 36–46)
HGB BLD-MCNC: 15.1 G/DL (ref 14–18)
LYMPHOCYTES # BLD AUTO: 0.8 10^3/UL (ref 1–4.8)
MCH RBC QN AUTO: 21 PG (ref 27–31)
MCHC RBC AUTO-ENTMCNC: 31 G/DL (ref 31–36)
MCV RBC AUTO: 69 FL (ref 80–94)
MICROCYTES BLD QL SMEAR: (no result)
MONOCYTES # BLD AUTO: 1.1 10^3/UL (ref 0–0.8)
NEUTROPHILS # BLD AUTO: 10.6 10^3/UL (ref 1.5–7.7)
NRBC # BLD AUTO: 0 10^3/UL
NRBC BLD QL AUTO: 0.1
PLATELET # BLD AUTO: 340 10^3/UL (ref 150–450)
POLYCHROMASIA BLD QL SMEAR: (no result)
POTASSIUM SERPL-SCNC: (no result) MMOL/L (ref 3.5–5)
PROT SERPL-MCNC: 8.1 G/DL (ref 6.4–8.9)
RBC # BLD AUTO: 7.06 10^6 /UL (ref 4.18–5.48)
SODIUM SERPL-SCNC: 139 MMOL/L (ref 135–145)
WBC # BLD AUTO: 12.8 10^3/UL (ref 3.5–10.8)

## 2019-03-27 PROCEDURE — 83880 ASSAY OF NATRIURETIC PEPTIDE: CPT

## 2019-03-27 PROCEDURE — 86140 C-REACTIVE PROTEIN: CPT

## 2019-03-27 PROCEDURE — 80053 COMPREHEN METABOLIC PANEL: CPT

## 2019-03-27 PROCEDURE — 71046 X-RAY EXAM CHEST 2 VIEWS: CPT

## 2019-03-27 PROCEDURE — 85379 FIBRIN DEGRADATION QUANT: CPT

## 2019-03-27 PROCEDURE — 99284 EMERGENCY DEPT VISIT MOD MDM: CPT

## 2019-03-27 PROCEDURE — 71275 CT ANGIOGRAPHY CHEST: CPT

## 2019-03-27 PROCEDURE — G0378 HOSPITAL OBSERVATION PER HR: HCPCS

## 2019-03-27 PROCEDURE — 93005 ELECTROCARDIOGRAM TRACING: CPT

## 2019-03-27 PROCEDURE — 96376 TX/PRO/DX INJ SAME DRUG ADON: CPT

## 2019-03-27 PROCEDURE — 70491 CT SOFT TISSUE NECK W/DYE: CPT

## 2019-03-27 PROCEDURE — 87651 STREP A DNA AMP PROBE: CPT

## 2019-03-27 PROCEDURE — 85025 COMPLETE CBC W/AUTO DIFF WBC: CPT

## 2019-03-27 PROCEDURE — 83735 ASSAY OF MAGNESIUM: CPT

## 2019-03-27 PROCEDURE — 36415 COLL VENOUS BLD VENIPUNCTURE: CPT

## 2019-03-27 PROCEDURE — 96375 TX/PRO/DX INJ NEW DRUG ADDON: CPT

## 2019-03-27 PROCEDURE — 85060 BLOOD SMEAR INTERPRETATION: CPT

## 2019-03-27 PROCEDURE — 96374 THER/PROPH/DIAG INJ IV PUSH: CPT

## 2019-03-27 PROCEDURE — 84484 ASSAY OF TROPONIN QUANT: CPT

## 2019-03-27 NOTE — XMS REPORT
Continuity of Care Document (CCD)

 Created on:2019



Patient:Hipolito Yancey

Sex:Male

:1965

External Reference #:[id number]





Demographics







 Address  [address]



   [address]



   [place] [address]

 

 Home Phone  [phone]

 

 Mobile Phone  [phone]

 

 Preferred Language  en

 

 Marital Status  Not  or 

 

 Sabianist Affiliation  Unknown

 

 Race  White

 

 Ethnic Group  Not  or 









Author







 Name  Abram Clark MD

 

 Address  [address]



   Unavailable



   [place] [address]









Care Team Providers







 Name  Role  Phone

 

 Abram Clark MD  Care Team Information   Unavailable









Payers







 Date  Identification Numbers  Payment Provider  Subscriber

 

 Effective:  Policy Number: [id number]  [facility]  Hipolito Yancey



 2015    [employer]  









 PayID: [id number]  [address] [address]









 [place] [address]









 Expires: 10/31/2015  Policy Number: [id number]  Medicare - [facility]  Hipolito Yancey









 PayID: [id number]  [address]









 [place] [address]









 Expires: 2018  Policy Number: [id number]  Medicaid After Medicare  Hipolito Yancey









 Group Name: 2 1  [address]

 

 PayID: [id number]  [place] [address]







Advance Directives







 Type  Date  Description  Status  Comment

 

 Other Directive  2011  Health Care Proxy  Current and Verified  

 

 Other Directive  2011  Power Of   Current and Verified  POA







Problems







 Description

 

 No Information







Family History







 Date  Family Member(s)  Observation  Comments

 

   Father  Healthy  

 

   Mother  CAD  

 

   Mother  Cancer, Breast  

 

   Mother  Scoliosis  







Social History







 Type  Date  Description  Comments

 

 Birth Sex    Unknown  

 

 Marital Status    Single  

 

 Lives With    Alone  

 

 Smoke-Free    Home is smoke-free  

 

 Occupation    Disabled  

 

 Occupation    [employer] Years  

 

 Tobacco Use  Start: Unknown  Never Smoked Cigarettes  

 

 ETOH Use    Denies alcohol use  

 

 Recreational Drug Use    Denies Drug Use  

 

 Tobacco Use  Start: Unknown  Patient has never smoked  

 

 Guns in Home    No  







Allergies, Adverse Reactions, Alerts







 Date  Description  Reaction  Status  Severity  Comments

 

 2018  Pollen    Active    

 

 2018  Ragweed    Active    

 

 2018  Aretha Goldenrod Pollen Extract    Active    

 

 2018  Dandelion Extract    Active    

 

 2013  NKDA    Inactive    







Medications







 Medication  Date  Status  Form  Strength  Qnty  SIG  Indications  Ordering



                 Provider

 

 Hydrochlorothiazi    Active  Tablets  12.5mg  30tab  1 tab by  R60.9  
Abram



         s  mouth    Heetderks



             every    , MD



             morning    

 

 Ventolin HFA    Active  Aerosol  108(90Bas  8gm  1-2 puffs          e)    four times    Heetderks



         mcg/Act    a day as    , MD



             needed    

 

 Semprex-D    Active  Capsules  8-60mg  60cap  Take One            s  Capsule By    COMFORT Gray,



             Mouth Four    FNP-C



             Times A    



             Day    

 

 Sudafed 12 Hour  10/12  Active  Tablets ER  120mg  30tab  1 tab by  R09.81  
    12HR    s  mouth    Heetderks



             twice a    , MD



             day as    



             needed    

 

 Fluticasone  10/12  Active  Suspension  50mcg/Act  16gm  2 sprays    Abram



 Propionate  /2018          into each    Heetderks



             nostril    , MD



             once daily    

 

 Mucinex    Active  Tablets ER  600mg  60tab  take one        12HR    s  tablet by    COMFORT Gray,



             mouth    FNP-C



             every 12    



             hours as    



             needed for    



             mucus    

 

 Xyzal Allergy    Active  Tablets  5mg  60tab  1 tab by    Abram



 24H        s  mouth as    Heetderks



             needed    , MD

 

 Clotrimazole/Beta    Active  Cream  1-0.05%  15gm  1 apply to  B37.2  
Arbam



 methasone            affected    Heetderks



 Dipropionate            area twice    , MD



             a day for    



             2 weeks    

 

 Proctozone-HC    Active  Cream  2.5%  30gm  apply to  K64.9            hemorrhoid    COMFORT Gray,



             s 3 - 4    FNP-C



             times    



             daily if    



             needed    

 

 Lidocaine-Priloca    Active  Cream  2.5-2.5%  25gm  apply pea  K64.9  
w          sized area    COMFORT Gray,



             3 to 4    FNP-C



             times    



             daily for    



             pain    

 

 Levothroid    Active  Tablets  88mcg                      DILEEP [facility]

 

 Lorazepam    Active  Tablets  1mg    1 po tid                  DILEEP [facility]

 

 Trazodone HCL    Active  Tablets  50mg  30tab  take 1    Ginette



           s  tablet po    Ramona,



             bid    FNP-C

 

 Simvastatin    Active  Tablets  20mg  90tab  take one    Abram



           s  tablet by    Amber



             mouth at    , MD



             bedtime    



             for    



             cholestero    



             l    

 

 Montelukast    Active  Tablets  10mg  30tab  Take One  R05  Abram



 Sodium          s  Tablet By    Amber



             Mouth AT    , MD



             Bedtime    

 

 Abilify    Active  Tablets  10mg    1 po hs    Unknown



   /0000              

 

 Hydroxyzine HCL    Active  Tablets  50mg  60tab  1 tab po    Unknown



   /        s  tid    

 

 Clomipramine HCL    Active  Capsules  50mg    1 tab po    Unknown



             bid    

 

 Androgel    Active  Gel  25mg/2.5G    apply 1    Unknown



   /0000      M    packet to    



             skin once    



             daily    

 

 Quetiapine    Active  Tablets  300mg    take 2    Unknown



 Fumarate  /0000          tablets po    



             q hs    

 

 Gabapentin    Active  Capsules  300mg  90cap  1 tablet    Unknown



   /        s  po tid    

 

 Hydrocodone    Active  Solution  7.5-325mg        Ruparelia



 Bitartrate/Acetam  /      /15ML        ,Alvin head M.D.

 

 Trazodone HCL    Active  Tablets  50mg    Take One    Unknown



             Tablet By    



             Mouth    



             Twice A    



             Day    

 

                 

 

 Zyrtec Allergy    Hx  Capsules  10mg  30cap  1 by mouth    Abram        s  every day    Amber Best MD



                 

 

 Econazole Nitrate    Hx  Cream  1%  30gm  apply              twice a    Ramona,



   -          day to    FNP-C



             groin rash    



             as    



             directed    



             for 2-4    



             weeks    

 

 Clotrimazole/Beta    Hx  Cream  1-0.05%  15gm  1 apply to  L30.4  Abram



 methasone  /          affected    Ronniks



 Dipropionate  -          area twice    , MD



             a day for    



             1 week    

 

 Semprex-D  10/05  Hx  Capsules  8-60mg  60cap  take one            s  capsule by    Amber



   -          mouth four    , MD



             times a    



             day    

 

 Bactrim DS    Hx  Tablets  800-160mg  20tab  1 by mouth  M86.271          s  twice a    Ramona,



   -          day x 10    FNP-C



   02/21          days    



   /2018              

 

 Meloxicam    Hx  Tablets  15mg  30tab  1 by mouth  M72.2          s  every day,    Ramona,



   -          take with    FNP-C



             food    



                 

 

 Mucinex    Hx  Tablets ER  600mg  60tab  take 1  R05      12HR    s  tablet by    Ramona,



   -          mouth    FNP-C



   10/05          twice a    



             day as    



             needed for    



             cough and    



             to think    



             mucus..dri    



             nk extra    



             water    

 

 Meloxicam  05/10  Hx  Tablets  7.5mg  60tab  take 1  M72.2          s  tablet by    Ramona,



   -          mouth    FNP-C



             twice    



             daily with    



             food for    



             back pain    

 

 Keflex    Hx  Capsules  500mg  10cap  1 tab by    Ginette        s  mouth    Ramona,



   -          twice a    FNP-C



   04/18          day    



   /2017              

 

 Augmentin    Hx  Tablets  875-125mg  20tab  1 by mouth  L05.91          s  twice a    Ramona,



   -          day for    FNP-C



             infection,    



             take for    



             10 days    

 

 Ibuprofen    Hx  Tablets  600mg  120ta  take one            bs  tablet by    Ramona,



   -          mouth 4    FNP-C



   05/21          times    



   /2018          daily with    



             food as    



             needed for    



             pain    

 

 Loratadine    Hx  Tablets  10mg  30tab  1 by mouth            s  every day    Ramona,



   -              FNP-C



                 

 

 Doxycycline    Hx  Capsules  100mg  2caps  2 tablets  S40.861A  Ginette



 Monohydrate  /          by mouth    Ramona,



   -          now    FNP-C



                 

 

 Triamcinolone  10/25  Hx  Cream  0.1%  15gm  apply to  L30.9  Dominick



 Acetonide  /          affected    Letcher



   -          area 2-3    III,



             times    FNP-C



             daily    

 

 Cephalexin    Hx  Tablets  500mg  14tab  1 tablet  K12.2  Dominick



           s  by mouth    Letcher



   -          twice    III,



             daily for    FNP-C



             7 days    

 

 Keflex    Hx  Capsules  500mg  4caps  1 tab by    Abram



   /          mouth    Amber



   -          twice a    , MD



   12/28          day    



   /2016              

 

 Mupirocin    Hx  Ointment  2%  22gm  apply to  L08.9  Dominick



             affected    Anisha



   -          area 3    III,



             times    FNP-C



             daily for    



             7 days    

 

 Diazepam    Hx  Tablets  5mg  2tabs  take one              tablet 1    Ramona,



   -          hour prior    FNP-C



             to    



             procedure.    



             may repeat    



             x1    

 

 Zyrtec Allergy    Hx  Tablets  10mg  30tab  1 by mouth            s  every day    Ramona,



   -          as needed    FNP-C



   01/27          for    



   /2016          allergies    

 

 Mucinex    Hx  Tablets ER  600mg  60tab  take 1  J30.9      12HR    s  tablet by    COMFORT Gray,



   -          mouth    FNP-C



             twice           day as    



             needed for    



             cough and    



             to think    



             mucus..dri    



             nk extra    



             water    

 

 Econazole Nitrate    Hx  Cream  1%  15gm  apply to  110.9            groin area    Ramona,



   -          twice a    FNP-C



   05/10          day    



   /2017              

 

 Valium    Hx  Tablets  2mg  2tabs  1 by mouth              30 min    COMFORT Gray,



   -          prior to    FNP-C



             procedure          may repeat    



             in one    



             hour if    



             needed    

 

 Anusol-HC  10/22  Hx  Cream  2.5%  30uni  Apply To  455.0          ts  Rectum    COMFORT Gray,



   -          Three    FNP-C



             Times           Day as    



             Needed    

 

 Oxycodone HCL    Hx  Tablets  5mg  5five  1 by mouth  455.4            q6hr as    Ramona,



   -          needed    FNP-C



             severe    



   2015          pain    

 

 Lisinopril    Hx  Tablets  5mg  30tab  1 by mouth            s  every day    Ramona,



   -          replaces    FNP-C



             10 mg dose    



                 

 

 Sulfamethoxazole/    Hx  Tablets  800-160mg  20tab  1 po bid  682.8  
Shawnti



 Trimethoprim         s  for    COMFORT Gray,



   -          infection,    FNP-C



             take for    



   /2014          10 days    

 

 Fluticasone    Hx  Suspension  50mcg/Act  16gm  2 sprays    Ginette



 Propionate            into each    Ramona,



   -          nostril    FNP-C



             once daily    



                 

 

 Miralax    Hx  Packet  [id number]  1mont  1 packet  564.00          h  po daily    COMFORT Gray,



   -          for    FNP-C



             stools,    



             can    



             increase    



             to bid if    



             needed    

 

 Proctocare-HC    Hx  Cream  2.5%  28.35  Apply To  455.0          units  Rectum    COMFORT Gray,



   -          Three    FNP-C



   10/22          Times           Day as    



             Needed    

 

 Lisinopril    Hx  Tablets  10mg  30tab  Take One            s  Tablet By    COMFORT Gray,



   -          Mouth    FNP-C



             Every Day    



                 

 

 Cetirizine HCL    Hx  Tablets  10mg  30tab  1 po qd  786.2  Ginette        s      Ramona,



   -              FNP-C



                 

 

 Levothroid    Hx  Tablets  75mcg    1 tab po q    Celzo,



   /0000          am on    Renetta



   -          empty    



             stomach           hour prior    



             to meal    



             and other    



             meds    

 

 Loratadine-D 24HR  00  Hx  Tablets ER  10-240mg  90tab  take one    Ginette



       24HR    s  tablet by    Ramona,



   -          mouth    FNP-C



             every day    



                 







Immunizations







 CPT Code  Status  Date  Vaccine  Lot #

 

 [id number]  Given  2018  Influenza Virus Vaccine, Quadrivalent, 3 Yr >  
[id number]



       Quad, Preserv Free  

 

 [id number]  Given  10/01/2017  Influenza Virus Vaccine, Quadrivalent, 3 Yr >  



       Quad, Preserv Free  

 

 [id number]  Given  2017  Tdap (Adacel)  [id number]

 

 [id number]  Given  10/11/2016  Influenza Virus Vaccine, Quadrivalent, 3 Yr >  



       Quad, Preserv Free  

 

 [id number]  Given  10/11/2016  Prevnar-13 Pneumococcal Conjugate Vaccine  

 

 [id number]  Given  10/30/2015  Influenza Virus Vaccine, Quadrivalent, 3 Yr >  



       Quad, Preserv Free  

 

 16834  Given  01/10/2014  Influenza Vaccine-Preservative Free 3 Yrs And  
[id number]



       Above  







Vital Signs







 Date  Vital  Result  Comment

 

 2019  2:43pm  Weight  259.00 lb  









 Weight  117.482 kg  

 

 BP Systolic  128 mmHg  

 

 BP Diastolic  80 mmHg  

 

 Heart Rate  90 /min  

 

 Body Temperature  97.6 F  

 

 Respiratory Rate  18 /min  

 

 O2 % BldC Oximetry  96 %  









 2019 11:35am  Weight  255.00 lb  









 Weight  115.668 kg  

 

 BP Systolic  150 mmHg  

 

 BP Diastolic  90 mmHg  

 

 Heart Rate  92 /min  

 

 Body Temperature  97.9 F  

 

 Respiratory Rate  16 /min  

 

 O2 % BldC Oximetry  97 %  









 10/12/2018  1:54pm  Weight  237.00 lb  









 Weight  107.503 kg  

 

 BP Systolic  120 mmHg  

 

 BP Diastolic  78 mmHg  

 

 Heart Rate  80 /min  

 

 Body Temperature  97.7 F  

 

 Respiratory Rate  16 /min  

 

 O2 % BldC Oximetry  97 %  









 2018  1:07pm  Weight  237.00 lb  









 Weight  107.503 kg  

 

 BP Systolic  118 mmHg  

 

 BP Diastolic  74 mmHg  

 

 Heart Rate  84 /min  

 

 Body Temperature  97.3 F  

 

 Respiratory Rate  16 /min  

 

 Height  63 inches  5'3"

 

 BMI (Body Mass Index)  42.0 kg/m2  









 2018 11:57am  Weight  252.00 lb  









 Weight  114.307 kg  

 

 BP Systolic  102 mmHg  

 

 BP Diastolic  74 mmHg  

 

 Heart Rate  88 /min  

 

 Body Temperature  98.1 F  

 

 Respiratory Rate  16 /min  









 2018  9:39am  Weight  256.00 lb  









 Weight  116.122 kg  

 

 BP Systolic  124 mmHg  

 

 BP Diastolic  78 mmHg  

 

 Heart Rate  92 /min  

 

 Body Temperature  97.5 F  

 

 Respiratory Rate  20 /min  

 

 O2 % BldC Oximetry  98 %  









 2018  5:13pm  Weight  244.00 lb  









 Weight  110.678 kg  

 

 BP Systolic  130 mmHg  

 

 BP Diastolic  84 mmHg  

 

 Heart Rate  84 /min  

 

 Body Temperature  98.5 F  

 

 Height  64 inches  5'4"

 

 BMI (Body Mass Index)  41.9 kg/m2  

 

 O2 % BldC Oximetry  93 %  









 2018  1:53pm  Weight  236.00 lb  









 Weight  107.050 kg  

 

 BP Systolic  136 mmHg  

 

 BP Diastolic  98 mmHg  

 

 Heart Rate  88 /min  

 

 Body Temperature  97.4 F  

 

 Respiratory Rate  18 /min  

 

 O2 % BldC Oximetry  98 %  









 2018  4:28pm  Weight  233.00 lb  









 Weight  105.689 kg  

 

 BP Systolic  112 mmHg  

 

 BP Diastolic  78 mmHg  

 

 Heart Rate  92 /min  

 

 Body Temperature  97.3 F  

 

 Respiratory Rate  16 /min  









 2018  2:04pm  Weight  226.00 lb  









 Weight  102.514 kg  

 

 BP Systolic  120 mmHg  

 

 BP Diastolic  80 mmHg  

 

 Heart Rate  80 /min  

 

 Body Temperature  97.3 F  

 

 Respiratory Rate  15 /min  

 

 O2 % BldC Oximetry  98 %  









 2017  1:10pm  Weight  223.00 lb  









 Weight  101.153 kg  

 

 BP Systolic  110 mmHg  

 

 BP Diastolic  72 mmHg  

 

 Heart Rate  80 /min  

 

 Body Temperature  97.6 F  

 

 O2 % BldC Oximetry  97 %  









 2017 11:41am  Weight  218.00 lb  









 Weight  98.885 kg  

 

 BP Systolic  110 mmHg  

 

 BP Diastolic  78 mmHg  

 

 Heart Rate  96 /min  

 

 Body Temperature  98.1 F  

 

 Respiratory Rate  20 /min  

 

 O2 % BldC Oximetry  98 %  









 2017  3:11pm  Weight  223.00 lb  









 Weight  101.153 kg  

 

 BP Systolic  120 mmHg  

 

 BP Diastolic  80 mmHg  

 

 Heart Rate  84 /min  









 2017 10:08am  Weight  219.00 lb  









 Weight  99.338 kg  

 

 BP Systolic  120 mmHg  

 

 BP Diastolic  70 mmHg  

 

 Heart Rate  76 /min  

 

 Body Temperature  97.8 F  









 2017  9:00am  Weight  216.00 lb  









 Weight  97.978 kg  

 

 BP Systolic  100 mmHg  

 

 BP Diastolic  74 mmHg  

 

 Heart Rate  82 /min  

 

 Body Temperature  98.3 F  

 

 Respiratory Rate  16 /min  

 

 O2 % BldC Oximetry  98 %  









 2017 10:55am  Weight  219.00 lb  









 Weight  99.338 kg  

 

 BP Systolic  90 mmHg  

 

 BP Diastolic  64 mmHg  

 

 Heart Rate  86 /min  

 

 Body Temperature  97.9 F  

 

 Respiratory Rate  16 /min  









 2017 11:11am  Weight  220.00 lb  









 Weight  99.792 kg  

 

 BP Systolic  112 mmHg  

 

 BP Diastolic  70 mmHg  

 

 Heart Rate  72 /min  

 

 Body Temperature  98.2 F  

 

 Respiratory Rate  16 /min  









 05/15/2017  2:29pm  Weight  215.00 lb  









 Weight  97.524 kg  

 

 BP Systolic  126 mmHg  

 

 BP Diastolic  80 mmHg  

 

 Heart Rate  94 /min  

 

 Body Temperature  96.7 F  

 

 Respiratory Rate  20 /min  

 

 O2 % BldC Oximetry  97 %  









 05/10/2017 11:02am  Weight  221.00 lb  









 Weight  100.246 kg  

 

 BP Systolic  122 mmHg  

 

 BP Diastolic  82 mmHg  

 

 Heart Rate  90 /min  

 

 Body Temperature  98.6 F  

 

 Respiratory Rate  18 /min  

 

 O2 % BldC Oximetry  95 %  









 2017  9:42am  Weight  222.00 lb  









 Weight  100.699 kg  

 

 BP Systolic  116 mmHg  

 

 BP Diastolic  80 mmHg  

 

 Heart Rate  86 /min  

 

 Body Temperature  97.4 F  

 

 Respiratory Rate  16 /min  

 

 O2 % BldC Oximetry  96 %  









 2017 10:59am  Weight  216.00 lb  









 Weight  97.978 kg  

 

 BP Systolic  120 mmHg  

 

 BP Diastolic  74 mmHg  

 

 Heart Rate  76 /min  

 

 Body Temperature  97.9 F  

 

 Respiratory Rate  16 /min  









 2017  3:48pm  Weight  217.00 lb  









 Weight  98.431 kg  

 

 BP Systolic  120 mmHg  

 

 BP Diastolic  80 mmHg  

 

 Heart Rate  80 /min  

 

 Body Temperature  97.2 F  

 

 Respiratory Rate  16 /min  









 2017  3:04pm  Weight  220.00 lb  









 Weight  99.792 kg  

 

 BP Systolic  98 mmHg  

 

 BP Diastolic  76 mmHg  

 

 Heart Rate  100 /min  

 

 Body Temperature  98.0 F  

 

 Respiratory Rate  18 /min  

 

 O2 % BldC Oximetry  96 %  









 2016  9:37am  Weight  218.00 lb  









 Weight  98.885 kg  

 

 BP Systolic  130 mmHg  

 

 BP Diastolic  85 mmHg  

 

 Heart Rate  84 /min  

 

 Body Temperature  97.1 F  









 2016 10:30am  Weight  210.00 lb  









 Weight  95.256 kg  

 

 BP Systolic  110 mmHg  

 

 BP Diastolic  80 mmHg  

 

 Heart Rate  80 /min  

 

 Body Temperature  97.2 F  

 

 Respiratory Rate  16 /min  









 10/25/2016  9:04am  Weight  202.00 lb  









 Weight  91.627 kg  

 

 BP Systolic  132 mmHg  

 

 BP Diastolic  94 mmHg  

 

 Heart Rate  80 /min  

 

 Body Temperature  97.6 F  

 

 Respiratory Rate  20 /min  

 

 O2 % BldC Oximetry  97 %  









 2016  8:59am  Weight  206.00 lb  









 Weight  93.442 kg  

 

 BP Systolic  116 mmHg  

 

 BP Diastolic  72 mmHg  

 

 Heart Rate  88 /min  

 

 Body Temperature  97.5 F  

 

 Respiratory Rate  17 /min  

 

 O2 % BldC Oximetry  97 %  









 2016 11:11am  Weight  205.00 lb  









 Weight  92.988 kg  

 

 BP Systolic  100 mmHg  

 

 BP Diastolic  70 mmHg  

 

 Heart Rate  76 /min  

 

 Body Temperature  97.8 F  

 

 Respiratory Rate  20 /min  









 2016 10:05am  Weight  203.00 lb  









 Weight  92.081 kg  

 

 BP Systolic  120 mmHg  

 

 BP Diastolic  76 mmHg  

 

 Heart Rate  100 /min  

 

 Body Temperature  97.8 F  

 

 Respiratory Rate  20 /min  









 2016  9:00am  Weight  207.00 lb  









 Weight  93.895 kg  

 

 BP Systolic  118 mmHg  

 

 BP Diastolic  80 mmHg  

 

 Heart Rate  76 /min  

 

 Body Temperature  97.8 F  

 

 Respiratory Rate  20 /min  









 2016  9:23am  Weight  211.00 lb  









 Weight  95.710 kg  

 

 BP Systolic  108 mmHg  

 

 BP Diastolic  78 mmHg  

 

 Heart Rate  80 /min  

 

 Height  63 inches  5'3"

 

 BMI (Body Mass Index)  37.4 kg/m2  









 2016  2:39pm  Weight  214.00 lb  









 Weight  97.070 kg  

 

 BP Systolic  117 mmHg  

 

 BP Diastolic  86 mmHg  

 

 Heart Rate  84 /min  

 

 Body Temperature  96.8 F  









 2015 11:11am  Weight  218.00 lb  









 Weight  98.885 kg  

 

 BP Systolic  112 mmHg  

 

 BP Diastolic  80 mmHg  

 

 Heart Rate  92 /min  









 2015  3:50pm  Weight  220.00 lb  









 Weight  99.792 kg  

 

 BP Systolic  118 mmHg  

 

 BP Diastolic  78 mmHg  

 

 Heart Rate  80 /min  









 2015  4:41pm  Weight  220.00 lb  









 Weight  99.792 kg  

 

 BP Systolic  100 mmHg  

 

 BP Diastolic  70 mmHg  

 

 Heart Rate  108 /min  

 

 Body Temperature  98.7 F  

 

 O2 % BldC Oximetry  98 %  









 2014 12:40pm  Weight  192.00 lb  









 Weight  87.091 kg  

 

 BP Systolic  94 mmHg  

 

 BP Diastolic  66 mmHg  

 

 Heart Rate  88 /min  

 

 Body Temperature  97.4 F  









 10/03/2014 10:31am  Weight  185.00 lb  









 Weight  83.916 kg  

 

 BP Systolic  102 mmHg  

 

 BP Diastolic  66 mmHg  

 

 Heart Rate  68 /min  









 2014  8:12am  Weight  181.00 lb  









 Weight  82.102 kg  

 

 BP Systolic  102 mmHg  

 

 BP Diastolic  70 mmHg  

 

 Heart Rate  76 /min  









 2014  8:50am  BP Systolic  108 mmHg  









 BP Diastolic  54 mmHg  









 2014  9:00am  Weight  185.00 lb  









 Weight  83.916 kg  

 

 BP Systolic  110 mmHg  

 

 BP Diastolic  60 mmHg  

 

 Heart Rate  76 /min  









 2014  8:51am  Weight  196.00 lb  









 Weight  88.906 kg  

 

 BP Systolic  90 mmHg  repeat 98/68

 

 BP Diastolic  70 mmHg  repeat 98/68

 

 Heart Rate  76 /min  









 2014  9:48am  Weight  210.00 lb  









 Weight  95.256 kg  

 

 BP Systolic  100 mmHg  

 

 BP Diastolic  74 mmHg  

 

 Heart Rate  104 /min  

 

 Body Temperature  97.7 F  









 01/10/2014  8:40am  Weight  208.00 lb  









 Weight  94.349 kg  

 

 BP Systolic  120 mmHg  

 

 BP Diastolic  86 mmHg  

 

 Heart Rate  112 /min  









 2013 11:55am  Weight  209.00 lb  









 Weight  94.802 kg  

 

 BP Systolic  98 mmHg  

 

 BP Diastolic  66 mmHg  

 

 Heart Rate  104 /min  

 

 Body Temperature  97.5 F  









 2013 10:13am  Weight  210.00 lb  









 Weight  95.256 kg  

 

 BP Systolic  100 mmHg  

 

 BP Diastolic  60 mmHg  

 

 Heart Rate  80 /min  









 10/09/2013 12:12pm  Weight  206.00 lb  









 Weight  93.442 kg  

 

 BP Systolic  104 mmHg  

 

 BP Diastolic  76 mmHg  

 

 Heart Rate  96 /min  

 

 Body Temperature  98.6 F  

 

 O2 % BldC Oximetry  98 %  level at rest









 2013  9:00am  Weight  206.00 lb  









 Weight  93.442 kg  

 

 BP Systolic  102 mmHg  

 

 BP Diastolic  74 mmHg  

 

 Heart Rate  104 /min  

 

 Body Temperature  97.1 F  

 

 O2 % BldC Oximetry  98 %  









 2013 12:44pm  Weight  202.00 lb  









 Weight  91.627 kg  

 

 BP Systolic  110 mmHg  

 

 BP Diastolic  70 mmHg  

 

 Heart Rate  100 /min  

 

 Body Temperature  97.7 F  









 2013  8:58am  Weight  201.00 lb  









 Weight  91.174 kg  

 

 BP Systolic  110 mmHg  repeat 110/82

 

 BP Diastolic  90 mmHg  repeat 110/82

 

 Heart Rate  98 /min  

 

 Body Temperature  98.7 F  

 

 O2 % BldC Oximetry  97 %  AT Room Air









 2013 10:06am  Weight  210.75 lb  









 Weight  95.596 kg  

 

 BP Systolic  108 mmHg  

 

 BP Diastolic  84 mmHg  

 

 Heart Rate  92 /min  

 

 Body Temperature  97.9 F  

 

 O2 % BldC Oximetry  94 %  AT Room Air









 2013 11:19am  Weight  203.00 lb  









 Weight  92.081 kg  

 

 BP Systolic  104 mmHg  

 

 BP Diastolic  78 mmHg  

 

 Heart Rate  80 /min  









 2013  9:51am  Weight  198.00 lb  









 Weight  89.813 kg  

 

 BP Systolic  100 mmHg  

 

 BP Diastolic  70 mmHg  

 

 Heart Rate  110 /min  

 

 Body Temperature  98.7 F  

 

 Height  64.5 inches  5'4.50"

 

 BMI (Body Mass Index)  33.5 kg/m2  

 

 O2 % BldC Oximetry  98 %  AT Room Air







Results







 Test  Date  Facility  Test  Result  H/L  Range  Note

 

 Laboratory test  2019  [facility]  TSH (Thyroid  <
pending>      



 finding    [phone]  [facility])        









 B-Type Natriuretic Peptide BNP  <pending>      









 Laboratory test  2018  [facility]  TSH (Thyroid  
0.87 mcIU/mL  N  0.34-5.60  1



 finding    [phone]  [facility])        









 Free T4 (Free Thyroxine)  0.57 ng/dL  Low  0.61-1.12  2









 Lipid Profile  2018  [facility]  Triglycerides  
230 mg/dL      3



 (Trig/Chol/HDL)    [phone]          









 Cholesterol  153 mg/dL      4

 

 HDL Cholesterol  38.2 mg/dL      5

 

 LDL Cholesterol  69 mg/dL      6









 Laboratory  2018  [facility]  Hepatitis C  
Nonreactive    Nonreactive  7



 test finding    [phone]  Antibody        

 

 Comp Metabolic  03/15/2018  [facility]  Sodium  133 mmol/
L  N  133-145  



 Panel    [phone]          









 Potassium  4.0 mmol/L  N  3.5-5.0  

 

 Chloride  100 mmol/L  Low  101-111  

 

 Co2 Carbon Dioxide  25 mmol/L  N  22-32  

 

 Anion Gap  8 mmol/L  N  2-11  

 

 Glucose  92 mg/dL  N    

 

 Blood Urea Nitrogen  12 mg/dL  N  6-24  

 

 Creatinine  0.92 mg/dL  N  0.67-1.17  

 

 BUN/Creatinine Ratio  13.0  N  8-20  

 

 Calcium  9.6 mg/dL  N  8.6-10.3  

 

 Total Protein  7.0 g/dL  N  6.4-8.9  

 

 Albumin  3.8 g/dL  N  3.2-5.2  

 

 Globulin  3.2 g/dL  N  2-4  

 

 Albumin/Globulin Ratio  1.2  N  1-3  

 

 Total Bilirubin  1.00 mg/dL  N  0.2-1.0  

 

 Alkaline Phosphatase  84 U/L  N    

 

 Alt  19 U/L  N  7-52  

 

 Ast  23 U/L  N  13-39  

 

 Egfr Non-  86.1    >60  

 

 Egfr   110.7    >60  8









 Laboratory test  03/15/2018  [facility]  C Reactive  
146.74 mg/L  High  < 5.00  9



 finding    [phone]  Protein        

 

 CBC Auto Diff  03/15/2018  [facility]  White Blood  12.3
  High  3.5-10.8  



     [phone]  Count  10^3/uL      









 Red Blood Count  6.07 10^6/uL  High  4.0-5.4  

 

 Hemoglobin  17.7 g/dL  N  14.0-18.0  

 

 Hematocrit  53 %  High  42-52  

 

 Mean Corpuscular Volume  87 fL  N  80-94  

 

 Mean Corpuscular Hemoglobin  29 pg  N  27-31  

 

 Mean Corpuscular HGB Conc  34 g/dL  N  31-36  

 

 Red Cell Distribution Width  14 %  N  10.5-15  

 

 Platelet Count  197 10^3/uL  N  150-450  

 

 Mean Platelet Volume  8 um3  N  7.4-10.4  

 

 Abs Neutrophils  9.0 10^3/uL  High  1.5-7.7  

 

 Abs Lymphocytes  1.5 10^3/uL  N  1.0-4.8  

 

 Abs Monocytes  1.5 10^3/uL  High  0-0.8  

 

 Abs Eosinophils  0.2 10^3/uL  N  0-0.6  

 

 Abs Basophils  0.1 10^3/uL  N  0-0.2  

 

 Abs Nucleated RBC  0 10^3/uL      

 

 Granulocyte %  73.0 %  N  38-83  

 

 Lymphocyte %  12.6 %  Low  25-47  

 

 Monocyte %  12.2 %  High  0-7  

 

 Eosinophil %  1.8 %  N  0-6  

 

 Basophil %  0.4 %  N  0-2  

 

 Nucleated Red Blood Cells %  0.1      









 Laboratory test  03/15/2018  [facility]  Lactic Acid  
1.6 mmol/L  N  0.5-2.0  10



 finding    [phone]          

 

 Inr/Protime  03/15/2018  [facility]  Inr  0.99  N  0.77-
1.02  



     [phone]          

 

 Laboratory test  03/15/2018  [facility]  Partial  30.3 
seconds  N  26.0-36.3  



 finding    [phone]  Thrombo Time        



       PTT        









 Blood Culture  SEE RESULT BELOW      11









 CBC Auto  2018  [facility]  White Blood  11.3 10^3/
uL  High  3.5-10.8  



 Diff    [phone]  Count        









 Red Blood Count  5.79 10^6/uL  High  4.0-5.4  

 

 Hemoglobin  17.1 g/dL  N  14.0-18.0  

 

 Hematocrit  51 %  N  42-52  

 

 Mean Corpuscular Volume  88 fL  N  80-94  

 

 Mean Corpuscular Hemoglobin  30 pg  N  27-31  

 

 Mean Corpuscular HGB Conc  34 g/dL  N  31-36  

 

 Red Cell Distribution Width  14 %  N  10.5-15  

 

 Platelet Count  235 10^3/uL  N  150-450  

 

 Mean Platelet Volume  8 um3  N  7.4-10.4  

 

 Abs Neutrophils  8.6 10^3/uL  High  1.5-7.7  

 

 Abs Lymphocytes  1.6 10^3/uL  N  1.0-4.8  

 

 Abs Monocytes  0.9 10^3/uL  High  0-0.8  

 

 Abs Eosinophils  0.1 10^3/uL  N  0-0.6  

 

 Abs Basophils  0.1 10^3/uL  N  0-0.2  

 

 Abs Nucleated RBC  0.1 10^3/uL      

 

 Granulocyte %  76.2 %  N  38-83  

 

 Lymphocyte %  14.4 %  Low  25-47  

 

 Monocyte %  7.8 %  High  0-7  

 

 Eosinophil %  1.0 %  N  0-6  

 

 Basophil %  0.6 %  N  0-2  

 

 Nucleated Red Blood Cells %  0.7      









 Hemoglobin/Hematacrit  2017  [facility]  
Hemoglobin  17.5  N  14.0-18.0  



     [phone]    g/dL      









 Hematocrit  52 %  N  42-52  









 Laboratory test  2017  [facility]  Rheumatoid 
Factor  <15 IU/mL  N  <15  12



 finding    [phone]          









 Erythrocyte Sed Rate  0 mm/Hr  N  0-20  13

 

 C Reactive Protein  18.21 mg/L  High  < 5.00  14









 Lyme Western  2017  [facility]  Lyme Disease  
Negative  N  Negative  



 Blot    [phone]  IgG Ab WB        









 Lyme Disease IgG Bands Present  No bands detecte <SEE NOTE> kDa  N    15

 

 Lyme Disease IgM Ab WB  Negative  N  Negative  

 

 Lyme Disease IgM Bands Present  No bands detecte <SEE NOTE> kDa  N    16

 

 Lyme Disease Interpretation  See Comment  N    17









 Tick-Borne Panel  2017  [facility]  Babesia  
Negative  N  Negative  



 PCR Blood    [phone]  microti PCR        









 Babesia ducani  Negative  N  Negative  

 

 Babesia divergens/Mo-1  Negative  N  Negative  18

 

 Anaplasma phagocytophilum  Negative  N  Negative  

 

 Ehrlichia chaffeensis  Negative  N  Negative  

 

 Ehrlichia ewingii/canis  Negative  N  Negative  

 

 Ehrlichia muris-like  Negative  N  Negative  19

 

 B. miyamotoi PCR, B  Negative  N  Negative  20









 CBC Auto Diff  2017  [facility]  White Blood  9.5 
10^3/uL  N  3.5-10.8  



     [phone]  Count        









 Red Blood Count  6.50 10^6/uL  High  4.0-5.4  

 

 Hemoglobin  19.8 g/dL  High  14.0-18.0  

 

 Hematocrit  61 %  High  42-52  21

 

 Mean Corpuscular Volume  94 fL  N  80-94  

 

 Mean Corpuscular Hemoglobin  31 pg  N  27-31  

 

 Mean Corpuscular HGB Conc  33 g/dL  N  31-36  

 

 Red Cell Distribution Width  15 %  N  10.5-15  

 

 Platelet Count  206 10^3/uL  N  150-450  

 

 Mean Platelet Volume  9 um3  N  7.4-10.4  

 

 Abs Neutrophils  6.2 10^3/uL  N  1.5-7.7  

 

 Abs Lymphocytes  1.8 10^3/uL  N  1.0-4.8  

 

 Abs Monocytes  1.2 10^3/uL  High  0-0.8  

 

 Abs Eosinophils  0.2 10^3/uL  N  0-0.6  

 

 Abs Basophils  0.1 10^3/uL  N  0-0.2  

 

 Abs Nucleated RBC  0.08 10^3/uL  N    

 

 Granulocyte %  65.2 %  N  38-83  

 

 Lymphocyte %  19.2 %  Low  25-47  

 

 Monocyte %  12.7 %  High  1-9  

 

 Eosinophil %  2.2 %  N  0-6  

 

 Basophil %  0.7 %  N  0-2  

 

 Nucleated Red Blood Cells %  0.9  N    









 Comp Metabolic Panel  2017  [facility]  Sodium  
137 mmol/L  N  133-145  



     [phone]          









 Potassium  4.3 mmol/L  N  3.5-5.0  

 

 Chloride  104 mmol/L  N  101-111  

 

 Co2 Carbon Dioxide  27 mmol/L  N  22-32  

 

 Anion Gap  6 mmol/L  N  2-11  

 

 Glucose  70 mg/dL  N    

 

 Blood Urea Nitrogen  18 mg/dL  N  6-24  

 

 Creatinine  0.97 mg/dL  N  0.67-1.17  

 

 BUN/Creatinine Ratio  18.6  N  8-20  

 

 Calcium  9.1 mg/dL  N  8.6-10.3  

 

 Total Protein  6.4 g/dL  N  6.4-8.9  

 

 Albumin  4.0 g/dL  N  3.2-5.2  

 

 Globulin  2.4 g/dL  N  2-4  

 

 Albumin/Globulin Ratio  1.7  N  1-3  

 

 Total Bilirubin  0.50 mg/dL  N  0.2-1.0  

 

 Alkaline Phosphatase  71 U/L  N    

 

 Alt  25 U/L  N  7-52  

 

 Ast  17 U/L  N  13-39  

 

 Egfr Non-  81.3  N  >60  

 

 Egfr   104.5  N  >60  22









 Laboratory test  2017  [facility]  TSH (Thyroid  
2.01 mcIU/mL  N  0.34-5.60  23



 finding    [phone]  [facility])        









 Lyme Disease Serology  Negative  N  Negative  24

 

 Vitamin D, 1,25 Dihydroxy  33 pg/mL  N  18-64  25









 Laboratory test  05/15/2017  [facility]  Surgical  SEE 
RESULT      26, 27



 finding    [phone]  Pathology  BELOW      

 

 Comp Metabolic  2016  [facility]  Sodium  135 mmol/
L  N  133-1  



 Panel    [phone]        45  









 Potassium  4.5 mmol/L  N  3.5-5.0  

 

 Chloride  102 mmol/L  N  101-111  

 

 Co2 Carbon Dioxide  27 mmol/L  N  22-32  

 

 Anion Gap  6 mmol/L  N  2-11  

 

 Glucose  89 mg/dL  N    

 

 Blood Urea Nitrogen  10 mg/dL  N  6-24  

 

 Creatinine  0.89 mg/dL  N  0.67-1.17  

 

 BUN/Creatinine Ratio  11.2  N  8-20  

 

 Calcium  9.5 mg/dL  N  8.6-10.3  

 

 Total Protein  6.8 g/dL  N  6.4-8.9  

 

 Albumin  3.9 g/dL  N  3.2-5.2  

 

 Globulin  2.9 g/dL  N  2-4  

 

 Albumin/Globulin Ratio  1.3  N  1-3  

 

 Total Bilirubin  0.40 mg/dL  N  0.2-1.0  

 

 Alkaline Phosphatase  67 U/L  N    

 

 Alt  33 U/L  N  7-52  

 

 Ast  26 U/L  N  13-39  

 

 Egfr Non-  89.8  N  >60  

 

 Egfr   115.4  N  >60  28









 Laboratory test  2016  [facility]  Troponin-I  
0.00 ng/mL  N  <0.04  29



 finding    [phone]  (TnI)        

 

 CBC Auto Diff  2016  [facility]  White Blood  10.2
  N  3.5-10.8  



     [phone]  Count  10^3/uL      









 Red Blood Count  6.45 10^6/uL  High  4.0-5.4  

 

 Hemoglobin  19.0 g/dL  High  14.0-18.0  

 

 Hematocrit  57 %  High  42-52  

 

 Mean Corpuscular Volume  88 fL  N  80-94  

 

 Mean Corpuscular Hemoglobin  30 pg  N  27-31  

 

 Mean Corpuscular HGB Conc  34 g/dL  N  31-36  

 

 Red Cell Distribution Width  14 %  N  10.5-15  

 

 Platelet Count  216 10^3/uL  N  150-450  

 

 Mean Platelet Volume  8 um3  N  7.4-10.4  

 

 Abs Neutrophils  7.3 10^3/uL  N  1.5-7.7  

 

 Abs Lymphocytes  1.7 10^3/uL  N  1.0-4.8  

 

 Abs Monocytes  1.0 10^3/uL  High  0-0.8  

 

 Abs Eosinophils  0.1 10^3/uL  N  0-0.6  

 

 Abs Basophils  0.1 10^3/uL  N  0-0.2  

 

 Abs Nucleated RBC  0.01 10^3/uL  N    

 

 Granulocyte %  71.5 %  N  38-83  

 

 Lymphocyte %  16.4 %  Low  25-47  

 

 Monocyte %  10.0 %  High  1-9  

 

 Eosinophil %  1.3 %  N  0-6  

 

 Basophil %  0.8 %  N  0-2  

 

 Nucleated Red Blood Cells %  0  N    









 Laboratory test  2016  [facility]  Lactic Acid  
1.5 mmol/L  N  0.5-2.0  30



 finding    [phone]          









 C Reactive Protein  3.48 mg/L  N  < 5.00  31









 Laboratory test  2016  [facility]  Surgical  SEE 
RESULT      32



 finding    [phone]  Pathology  BELOW      

 

 Comp Metabolic  2015  [facility]  Sodium  139 mmol/
L  N  133-14  



 Panel    [phone]        5  









 Potassium  4.8 mmol/L  N  3.5-5.0  

 

 Chloride  102 mmol/L  N  101-111  

 

 Co2 Carbon Dioxide  30 mmol/L  N  22-32  

 

 Anion Gap  7 mmol/L  N  2-11  

 

 Glucose  77 mg/dL  N    

 

 Blood Urea Nitrogen  14 mg/dL  N  6-24  

 

 Creatinine  0.88 mg/dL  N  0.67-1.17  

 

 BUN/Creatinine Ratio  15.9  N  8-20  

 

 Calcium  9.7 mg/dL  N  8.6-10.3  

 

 Total Protein  7.0 g/dL  N  6.4-8.9  

 

 Albumin  4.5 g/dL  N  3.2-5.2  

 

 Globulin  2.5 g/dL  N  2-4  

 

 Albumin/Globulin Ratio  1.8  N  1-3  

 

 Total Bilirubin  0.40 mg/dL  N  0.2-1.0  

 

 Alkaline Phosphatase  65 U/L  N    

 

 Alt  30 U/L  N  7-52  

 

 Ast  19 U/L  N  13-39  

 

 Egfr Non-  91.3  N  >60  

 

 Egfr   117.4  N  >60  33









 Lipid Profile  2015  [facility]  Triglycerides  
189 mg/dL  N    34



 (Trig/Chol/HDL)    [phone]          









 Cholesterol  180 mg/dL  N    35

 

 HDL Cholesterol  45.6 mg/dL  N    36

 

 LDL Cholesterol  97 mg/dL  N    37









 Laboratory test  2015  [facility]  Hemoglobin A1c  
5.3 %  N  Less than  38



 finding    [phone]  (Glyco HGB)      6.0  

 

 CBC Auto Diff  2015  [facility]  White Blood  8.7  
N  3.5-10.8  



     [phone]  Count  10^3/uL      









 Red Blood Count  5.76 10^6/uL  High  4.0-5.4  

 

 Hemoglobin  17.4 g/dL  N  14.0-18.0  

 

 Hematocrit  53 %  High  42-52  

 

 Mean Corpuscular Volume  92 fL  N  80-94  

 

 Mean Corpuscular Hemoglobin  30 pg  N  27-31  

 

 Mean Corpuscular HGB Conc  33 g/dL  N  31-36  

 

 Red Cell Distribution Width  14 %  N  10.5-15  

 

 Platelet Count  201 10^3/uL  N  150-450  

 

 Mean Platelet Volume  8 um3  N  7.4-10.4  

 

 Abs Neutrophils  5.9 10^3/uL  N  1.5-7.7  

 

 Abs Lymphocytes  1.8 10^3/uL  N  1.0-4.8  

 

 Abs Monocytes  0.7 10^3/uL  N  0-0.8  

 

 Abs Eosinophils  0.2 10^3/uL  N  0-0.6  

 

 Abs Basophils  0 10^3/uL  N  0-0.2  

 

 Abs Nucleated RBC  0.05 10^3/uL  N    

 

 Granulocyte %  67.7 %  N  38-83  

 

 Lymphocyte %  21.3 %  Low  25-47  

 

 Monocyte %  8.3 %  N  1-9  

 

 Eosinophil %  2.1 %  N  0-6  

 

 Basophil %  0.6 %  N  0-2  

 

 Nucleated Red Blood Cells %  0.6  N    









 Lipid Profile  2014  [facility]  Triglycerides  
119 mg/dL  N    39



 (Trig/Chol/HDL)    [phone]          









 Cholesterol  149 mg/dL  N    40

 

 HDL Cholesterol  36.3 mg/dL  N    41

 

 LDL Cholesterol  89 mg/dL  N    42









 Comp Metabolic Panel  2014  [facility]  Sodium  
136 mmol/L    133-145  



     [phone]          









 Potassium  4.3 mmol/L    3.5-5.0  

 

 Chloride  100 mmol/L  Low  101-111  

 

 Co2 Carbon Dioxide  28.0 mmol/L    22-32  

 

 Anion Gap  8.0 mmol/L    2-11  

 

 Glucose  79 mg/dL      

 

 Blood Urea Nitrogen  13 mg/dL    6-24  

 

 Creatinine  0.90 mg/dL    0.50-1.40  

 

 BUN/Creatinine Ratio  14.4    8-20  

 

 Calcium  9.5 mg/dL    8.1-9.9  

 

 Total Protein  6.4 g/dL    6.2-8.1  

 

 Albumin  4.4 g/dL    3.6-5.4  

 

 Globulin  2.0 g/dL    2-4  

 

 Albumin/Globulin Ratio  2.2    1-3  

 

 Total Bilirubin  1.0 mg/dL    0.4-1.5  

 

 Alkaline Phosphatase  72 U/L      

 

 Alt  49 U/L    14-54  

 

 Ast  23 U/L    12-42  

 

 Egfr Non-  89.7    >60  

 

 Egfr   115.3    >60  43









 Lipid Profile  2014  [facility]  Triglycerides  
181 mg/dL      



 (Trig/Chol/HDL)    [phone]          









 Cholesterol  199 mg/dL    Less than 200  

 

 HDL Cholesterol  47 mg/dL    40-60  44

 

 Cholesterol/HDL Ratio  4.2 Average    1-4.44  

 

 LDL Cholesterol  115.8  High  Less Than 100  45









 Laboratory test  2014  [facility]  Hemoglobin A1c  
5.3 %    Less than  46



 finding    [phone]        6.0  

 

 Clomipramine  2014  [facility]  Clomipramine  103 
   70 - 200  



     [phone]    ng/ml      









 Desmethylclomipramine  203 ng/ml    150 - 300  47









 Basic Metabolic  01/10/2014  [facility]  Sodium  139 mmol
/L    133-145  



 Panel    [phone]          









 Potassium  4.6 mmol/L    3.5-5.0  

 

 Chloride  103 mmol/L    101-111  

 

 Co2 Carbon Dioxide  27.0 mmol/L    22-32  

 

 Anion Gap  9.0 mmol/L    2-11  

 

 Glucose  63 mg/dL  Low    

 

 Blood Urea Nitrogen  16 mg/dL    6-24  

 

 Creatinine  0.80 mg/dL    0.50-1.40  

 

 BUN/Creatinine Ratio  20.0    8-20  

 

 Calcium  9.5 mg/dL    8.1-9.9  

 

 Egfr Non-  102.7    >60  

 

 Egfr   132.1    >60  48









 CBC Auto Diff  01/10/2014  [facility]  White Blood  10.7 
10^3/uL    4.8-10.8  



     [phone]  Count        









 Red Blood Count  6.05 10^6/uL  High  4.0-5.4  

 

 Hemoglobin  18.5 g/dL  High  14.0-18.0  

 

 Hematocrit  54 %  High  42-52  

 

 Mean Corpuscular Volume  88 fL    80-94  

 

 Mean Corpuscular Hemoglobin  31 pg    27-31  

 

 Mean Corpuscular HGB Conc  35 g/dL    31-36  

 

 Red Cell Distribution Width  14 %    10.5-15  

 

 Platelet Count  210 10^3/uL    150-450  

 

 Mean Platelet Volume  9 um3    7.4-10.4  

 

 Abs Neutrophils  7.0 10^3/uL    1.5-7.7  

 

 Abs Lymphocytes  2.4 10^3/uL    1.0-4.8  

 

 Abs Monocytes  1.2 10^3/uL  High  0-0.8  

 

 Abs Eosinophils  0.1 10^3/uL    0-0.6  

 

 Abs Basophils  0.1 10^3/uL    0-0.2  

 

 Abs Nucleated RBC  0.01 10^3/uL      









 Statin  01/10/2014  [facility]  Ast  22 U/L    12-42  



     [phone]          









 Alt  47 U/L    14-54  









 Lipid Profile  01/10/2014  [facility]  Triglycerides  
162 mg/dL      



 (Trig/Chol/HDL)    [phone]          









 Cholesterol  184 mg/dL    Less than 200  

 

 HDL Cholesterol  45 mg/dL    40-60  49

 

 Cholesterol/HDL Ratio  4.1 Average    1-4.44  

 

 LDL Cholesterol  106.6  High  Less Than 100  50









 Manual Differential  01/10/2014  [facility]  Neutrophil 
%  73 %    38-83  



     [phone]          









 Band %  1 %    0-8  

 

 Lymphocytes %  17 %  Low  25-47  

 

 Monocytes %  9 %    0-13  

 

 RBC Morphology  Normal    Normal  









 Laboratory test  10/09/2013  [facility]  Strep Screen  neg    Neg  



 finding    [phone]   [phone]          

 

 CBC Auto Diff  2013  [facility]  White Blood  9.5 
10^3/uL    4.8-10.8  



     [phone]  Count        









 Red Blood Count  6.26 10^6/uL  High  4.0-5.4  

 

 Hemoglobin  18.2 g/dL  High  14.0-18.0  

 

 Hematocrit  57 %  High  42-52  

 

 Mean Corpuscular Volume  91 fL    80-94  

 

 Mean Corpuscular Hemoglobin  29 pg    27-31  

 

 Mean Corpuscular HGB Conc  32 g/dL    31-36  

 

 Red Cell Distribution Width  14 %    10.5-15  

 

 Platelet Count  220 10^3/uL    150-450  

 

 Mean Platelet Volume  9 um3    7.4-10.4  

 

 Abs Neutrophils  6.0 10^3/uL    1.5-7.7  

 

 Abs Lymphocytes  2.3 10^3/uL    1.0-4.8  

 

 Abs Monocytes  1.0 10^3/uL  High  0-0.8  

 

 Abs Eosinophils  0.2 10^3/uL    0-0.6  

 

 Abs Basophils  0.1 10^3/uL    0-0.2  

 

 Abs Nucleated RBC  0 10^3/uL      









 Basic Metabolic  2013  [facility]  Sodium  136 mmol
/L    133-145  



 Panel    [phone]          









 Potassium  4.5 mmol/L    3.5-5.0  

 

 Chloride  105 mmol/L    101-111  

 

 Co2 Carbon Dioxide  24.0 mmol/L    22-32  

 

 Anion Gap  7.0 mmol/L    2-11  

 

 Glucose  82 mg/dL      

 

 Blood Urea Nitrogen  16 mg/dL    6-24  

 

 Creatinine  0.80 mg/dL    0.50-1.40  

 

 BUN/Creatinine Ratio  20.0    8-20  

 

 Calcium  9.3 mg/dL    8.1-9.9  

 

 Egfr Non-  103.2    >60  

 

 Egfr   132.7    >60  51









 Lipid Profile  2013  [facility]  Triglycerides  
125 mg/dL      



 (Trig/Chol/HDL)    [phone]          









 Cholesterol  167 mg/dL    Less than 200  

 

 HDL Cholesterol  36 mg/dL  Low  40-60  52

 

 Cholesterol/HDL Ratio  4.6 Average  High  1-4.44  

 

 LDL Cholesterol  106.0  High  Less Than 100  53









 Statin  2013  [facility]  Ast  24 U/L    12-42  



     [phone]          









 Alt  48 U/L    14-54  









 Manual Differential  2013  [facility]  Neutrophil 
%  54 %    38-83  



     [phone]          









 Lymphocytes %  35 %    25-47  

 

 Monocytes %  5 %    0-13  

 

 Eosinophils %  1 %    0-6  

 

 Basophil %  1 %    0-2  

 

 Reactive Lymph %  4 %    0-6  

 

 RBC Morphology  Normal    Normal  









 Basic Metabolic  2013  [facility]  Sodium  139 mmol
/L    133-145  



 Panel    [phone]          









 Potassium  4.6 mmol/L    3.5-5.0  

 

 Chloride  104 mmol/L    101-111  

 

 Co2 Carbon Dioxide  28.0 mmol/L    22-32  

 

 Anion Gap  7.0 mmol/L    2-11  

 

 Glucose  86 mg/dL      

 

 Blood Urea Nitrogen  15 mg/dL    6-24  

 

 Creatinine  0.90 mg/dL    0.50-1.40  

 

 BUN/Creatinine Ratio  16.7    8-20  

 

 Calcium  9.8 mg/dL    8.1-9.9  

 

 Egfr Non-  90.1    >60  

 

 Egfr   115.8    >60  54









 Statin  2013  [facility]  Ast  23 U/L    12-42  



     [phone]          









 Alt  47 U/L    14-54  









 Lipid Profile  2013  [facility]  Triglycerides  
134 mg/dL      



 (Trig/Chol/HDL)    [phone]          









 Cholesterol  158 mg/dL    Less than 200  

 

 HDL Cholesterol  35 mg/dL  Low  40-60  55

 

 Cholesterol/HDL Ratio  4.5 Average  High  1-4.44  

 

 LDL Cholesterol  96.2 mg/dL    Less Than 100  56









 1  [id number]

 

 2  [id number]

 

 3  Desirable: <150



   Borderline High: 150-199



   High: 200-499



   Very High: >500

 

 4  Desirable: <200



   Borderline High: 200-239



   High: >239

 

 5  Low: <40



   Desirable: 40-60



   High: >60

 

 6  Desirable: <100



   Near Optimal: 100-129



   Borderline High: 130-159



   High: 160-189



   Very High: >189

 

 7  [id number]

 

 8  *******Because ethnic data is not always readily available,



   this report includes an eGFR for both -Americans and



   non- Americans.****



   The National Kidney Disease Education Program (NKDEP) does



   not endorse the use of the MDRD equation for patients that



   are not between the ages of 18 and 70, are pregnant, have



   extremes of body size, muscle mass, or nutritional status,



   or are non- or non-.



   According to the National Kidney Foundation, irrespective of



   diagnosis, the stage of the disease is based on the level of



   kidney function:



   Stage Description                      GFR(mL/min/1.73 m(2))



   1     Kidney damage with normal or decreased GFR       90



   2     Kidney damage with mild decrease in GFR          60-89



   3     Moderate decrease in GFR                         30-59



   4     Severe decrease in GFR                           15-29



   5     Kidney failure                       <15 (or dialysis)

 

 9  Acute inflammation:  >10.00

 

 10  [place] Severe Sepsis and Septic Shock Management Bundle Measure



   requires all lactic acids initially measuring >2.0 mmol/L be



   repeated.

 

 11  SEE RESULT BELOW



   -----------------------------------------------------------------------------
---------------



   Name:  HIPOLITO YANCEY                 : 1965    Attend Dr: Pito Geiger MD



   Acct:  [id number]  Unit: [id number]  AGE: 52            Location:  [facility]



   Re/15/18                        SEX: M             Status:    DEP ER



   -----------------------------------------------------------------------------
---------------



   



   SPEC: [id number]         YASMEEN:   03/15/    [facility] DR: Pito Geiger MD



   REQ:  [id number]              RECD:   03/15/



   STATUS: TRUE HANLEY DR: Ginette Littlejohn NP



   _



   SOURCE: NO SOURCE      SPDESC:



   ORDERED:  Blood Cult



   



   -----------------------------------------------------------------------------
---------------



   Procedure                         Result                         Reported   
        Site



   -----------------------------------------------------------------------------
---------------



   Aerobic Culture Bottle  Final                                    18-
9      ML



   No Growth Day 5



   



   Anaerobic Culture Bottle  Final                                  18-
9      ML



   No Growth Day 5



   



   -----------------------------------------------------------------------------
---------------



   * ML - Main Lab



   .



   



   



   



   



   



   



   



   



   



   



   



   



   



   



   



   



   



   



   



   



   



   



   



   



   ** END OF REPORT **



   



   DEPARTMENT OF PATHOLOGY,  [address] [place] [address]



   Phone # [phone]      Fax #[phone]



   Liam Hall M.D. Director     [facility] # [id number]

 

 12  Test Performed by:



   [address] [address]

 

 13  [id number]

 

 14  Acute inflammation:  >10.00

 

 15  No bands detected

 

 16  No bands detected

 

 17  Specific serologic response to B. burgdorferi infection is



   not detected, but cannot rule out early infection during



   which low or undetectable antibody levels to B. burgdorferi



   may be present.  If clinically indicated, a new serum



   specimen should be submitted in 7-14 days.



   -------------------ADDITIONAL INFORMATION-------------------



   CDC criteria require >=5 bands for IgG or >=2 bands for IgM



   for the Immunoblot to be considered positive. Bands



   (e.g.,p41) may be detected in patients without Lyme



   disease, and patterns not meeting the CDC criteria should



   be interpreted with caution.



   Immunoblot should be ordered only on specimens that are



   positive or equivocal by a FDA-licensed Lyme disease



   antibody screening test (e.g., EIA).



   Test Performed by:



   [facility] - [address] [address]

 

 18  -------------------ADDITIONAL INFORMATION-------------------



   This test was developed and its performance characteristics



   determined by [facility] in a manner consistent with CLIA



   requirements. This test has not been cleared or approved by



   the U.S. Food and Drug Administration.

 

 19  -------------------ADDITIONAL INFORMATION-------------------



   This test was developed and its performance characteristics



   determined by [facility] in a manner consistent with CLIA



   requirements. This test has not been cleared or approved by



   the U.S. Food and Drug Administration.

 

 20  -------------------ADDITIONAL INFORMATION-------------------



   This test was developed and its performance characteristics



   determined by [facility] in a manner consistent with CLIA



   requirements. This test has not been cleared or approved by



   the U.S. Food and Drug Administration.



   Test Performed by:



   [facility] - [address] [address]

 

 21  Consistent with previous results on 16.

 

 22  *******Because ethnic data is not always readily available,



   this report includes an eGFR for both -Americans and



   non- Americans.****



   The National Kidney Disease Education Program (NKDEP) does



   not endorse the use of the MDRD equation for patients that



   are not between the ages of 18 and 70, are pregnant, have



   extremes of body size, muscle mass, or nutritional status,



   or are non- or non-.



   According to the National Kidney Foundation, irrespective of



   diagnosis, the stage of the disease is based on the level of



   kidney function:



   Stage Description                      GFR(mL/min/1.73 m(2))



   1     Kidney damage with normal or decreased GFR       90



   2     Kidney damage with mild decrease in GFR          60-89



   3     Moderate decrease in GFR                         30-59



   4     Severe decrease in GFR                           15-29



   5     Kidney failure                       <15 (or dialysis)

 

 23  [id number]

 

 24  Serologic response to B. burgdorferi infection is not



   detected, but cannot rule out early infection during



   which low or undetectable antibody levels to



   B. burgdorferi may be present. If clinically indicated,



   a new serum specimen should be submitted in 7-14 days.



   Test Performed by:



   [facility] - [address] [address]

 

 25  -------------------ADDITIONAL INFORMATION-------------------



   This test was developed and its performance characteristics



   determined by [facility] in a manner consistent with CLIA



   requirements. This test has not been cleared or approved by



   the U.S. Food and Drug Administration.



   Test Performed by:



   [facility] - [address] [address]

 

 26  Referred to dermatology

 

 27  SEE RESULT BELOW



   -----------------------------------------------------------------------------
---------------



   Name:  HIPOLITO YANCEY                 : 1965    Attend Dr: Abram Clark MD



   Acct:  [id number]  Unit: [id number]  AGE: 51            Location:  [facility]



   Re/15/17                        SEX: M             Status:    REG REF



   -----------------------------------------------------------------------------
---------------



   



   SPEC: [id number]             YASMEEN: 05/15/      [facility] DR: Abram Clark MD



   REQ:  [id number]             RECD: 05/15/



   STATUS: SOUT



   _



   ORDERED:  LEVEL 4



   COMMENTS: [id number]



   



   FINAL DIAGNOSIS



   



   



   Skin, right cheek, biopsy:



   -- Basal cell carcinoma, superficial and nodular type, ulcerated.



   -- Lesional cells extend to one lateral specimen edge.



   



   



   



   CLINICAL HISTORY



   



   No history given



   



   GROSS DESCRIPTION



   



   The specimen is received in formalin labeled, Right Cheek Punch Biopsy, and 
consists of a



   0.6 cm tan-brown bosselated hairbearing skin punch excised to a depth of 0.4 
cm which is



   bisected and entirely submitted in one cassette.



   



   Signed __________(signature on file)___________ Joyce Hall MD  [id number]



   



   -----------------------------------------------------------------------------
---------------



   



   



   



   



   



   



   



   



   



   



   



   



   



   



   ** END OF REPORT **



   



   * ML=Testing performed at Main Lab



   DEPARTMENT OF PATHOLOGY,  [address] [place] [address]



   Phone # [phone]      Fax #[phone]



   Liam Hall M.D. Director     [facility] # [id number]

 

 28  *******Because ethnic data is not always readily available,



   this report includes an eGFR for both -Americans and



   non- Americans.****



   The National Kidney Disease Education Program (NKDEP) does



   not endorse the use of the MDRD equation for patients that



   are not between the ages of 18 and 70, are pregnant, have



   extremes of body size, muscle mass, or nutritional status,



   or are non- or non-.



   According to the National Kidney Foundation, irrespective of



   diagnosis, the stage of the disease is based on the level of



   kidney function:



   Stage Description                      GFR(mL/min/1.73 m(2))



   1     Kidney damage with normal or decreased GFR       90



   2     Kidney damage with mild decrease in GFR          60-89



   3     Moderate decrease in GFR                         30-59



   4     Severe decrease in GFR                           15-29



   5     Kidney failure                       <15 (or dialysis)

 

 29  NOTE: Critical Troponin is now >0.03 ng/mL.



   99th percentile=0.04 ng/mL



   



   Troponin results at [facility] and [facility] are not interchangeable.

 

 30  [facility] Severe Sepsis and Septic Shock Management Bundle Measure



   requires all lactic acids initially measuring >2.0 mmol/L be



   repeated.

 

 31  Acute inflammation:  >10.00

 

 32  SEE RESULT BELOW



   -----------------------------------------------------------------------------
---------------



   Name:  HIPOLITO YANCEY                 : 1965    Attend Dr: Abram Clark MD



   Acct:  [id number]  Unit: [id number]  AGE: 50            Location:  [facility]



   Re16                        SEX: M             Status:    REG REF



   -----------------------------------------------------------------------------
---------------



   



   SPEC: [id number]             YASMEEN:       SUBM DR: Abram Clark MD



   REQ:  [id number]             RECD: 1207



   STATUS: SOUT



   _



   ORDERED:  LEVEL III



   



   FINAL DIAGNOSIS



   



   



   Skin, site unspecified, excision:



   -- Epidermal inclusion cyst.



   



   



   



   CLINICAL HISTORY



   



   Present several months, is recurrent.



   



   PRE-OPERATIVE DIAGNOSIS



   



   Sebaceous cyst



   



   GROSS DESCRIPTION



   



   The specimen is received in formalin with no source identified, with a 
requisition labeled,



   Sebaceous Cyst, and consists of a 0.8 x 0.5 by 0.3 cm white indurated soft 
tissue fragment.



   The cut surface is a heterogeneous yellow-white mixture.  The specimen is 
inked, trisected



   and entirely submitted in one cassette.



   



   Signed __________(signature on file)___________ Liam Hall MD  1306



   



   -----------------------------------------------------------------------------
---------------



   



   



   



   



   



   



   



   



   



   



   



   ** END OF REPORT **



   



   * ML=Testing performed at Main Lab



   DEPARTMENT OF PATHOLOGY,  [address] [place] [address]



   Phone # [phone]      Fax #[phone]



   Liam Hall M.D. Director     [facility] # [id number]

 

 33  *******Because ethnic data is not always readily available,



   this report includes an eGFR for both -Americans and



   non- Americans.****



   The National Kidney Disease Education Program (NKDEP) does



   not endorse the use of the MDRD equation for patients that



   are not between the ages of 18 and 70, are pregnant, have



   extremes of body size, muscle mass, or nutritional status,



   or are non- or non-.



   According to the National Kidney Foundation, irrespective of



   diagnosis, the stage of the disease is based on the level of



   kidney function:



   Stage Description                      GFR(mL/min/1.73 m(2))



   1     Kidney damage with normal or decreased GFR       90



   2     Kidney damage with mild decrease in GFR          60-89



   3     Moderate decrease in GFR                         30-59



   4     Severe decrease in GFR                           15-29



   5     Kidney failure                       <15 (or dialysis)

 

 34  Desirable <150



   Borderline high 150-199



   High 200-499



   Very High >500

 

 35  Desirable <200



   Borderline high 200-239



   High >239

 

 36  Low <40



   Desirable: 40-60



   High: >60

 

 37  Desirable: <100 mg/dL



   Near Optimal: 100-129 mg/dL



   Borderline High: 130-159 mg/dL



   High: 160-189 mg/dL



   Very High: >189 mg/dL

 

 38  Therapeutic target for the treatment of diabetes



   [facility] patients is <7% HBA1C, and in selective



   patients <6.0%.Please refer to American Diabetes



   Association Diabetic care guidelines for further



   information.

 

 39  Desirable <150



   Borderline high 150-199



   High 200-499



   Very High >500

 

 40  Desirable <200



   Borderline high 200-239



   High >239

 

 41  Low <40



   Desirable: 40-60



   High: >60

 

 42  Desirable <100



   Near Optimal 100-129



   Borderline high 130-159



   High 160-189



   Very High >189

 

 43  *******Because ethnic data is not always readily available,



   this report includes an eGFR for both -Americans and



   non- Americans.****



   The National Kidney Disease Education Program (NKDEP) does



   not endorse the use of the MDRD equation for patients that



   are not between the ages of 18 and 70, are pregnant, have



   extremes of body size, muscle mass, or nutritional status,



   or are non- or non-.



   According to the National Kidney Foundation, irrespective of



   diagnosis, the stage of the disease is based on the level of



   kidney function:



   Stage Description                      GFR(mL/min/1.73 m(2))



   1     Kidney damage with normal or decreased GFR       90



   2     Kidney damage with mild decrease in GFR          60-89



   3     Moderate decrease in GFR                         30-59



   4     Severe decrease in GFR                           15-29



   5     Kidney failure                       <15 (or dialysis)

 

 44  HDL Interpretation:



   Undesirable: High Risk:  Less than 40 mg/dL



   Desirable:  Low Risk:  Greater than 60 mg/dL

 

 45  LDL Interpretation:



   Low Risk Optimal Level:  LDL Less than 100 mg/dL



   Near or Above Optimal:  -129 mg/dL



   Borderline High Risk:  -159 mg/dL



   High Risk:  -189 mg/dL



   Very High Risk:  LDL Greater than 189 mg/dL

 

 46  Therapeutic target for the treatment of diabetes



   [facility] patients is <7% HBA1C, and in selective



   patients <6.0%.Please refer to American Diabetes



   Association Diabetic care guidelines for further



   information.

 

 47  Desired clomipramine concentrations for the treatment



   of chronic pain:  20 - 85 ng/ml.



   Test Performed by:



   [facility]



   [address] [place] [address]

 

 48  *******Because ethnic data is not always readily available,



   this report includes an eGFR for both -Americans and



   non- Americans.****



   The National Kidney Disease Education Program (NKDEP) does



   not endorse the use of the MDRD equation for patients that



   are not between the ages of 18 and 70, are pregnant, have



   extremes of body size, muscle mass, or nutritional status,



   or are non- or non-.



   According to the National Kidney Foundation, irrespective of



   diagnosis, the stage of the disease is based on the level of



   kidney function:



   Stage Description                      GFR(mL/min/1.73 m(2))



   1     Kidney damage with normal or decreased GFR       90



   2     Kidney damage with mild decrease in GFR          60-89



   3     Moderate decrease in GFR                         30-59



   4     Severe decrease in GFR                           15-29



   5     Kidney failure                       <15 (or dialysis)

 

 49  HDL Interpretation:



   Undesirable: High Risk:  Less than 40 mg/dL



   Desirable:  Low Risk:  Greater than 60 mg/dL

 

 50  LDL Interpretation:



   Low Risk Optimal Level:  LDL Less than 100 mg/dL



   Near or Above Optimal:  -129 mg/dL



   Borderline High Risk:  -159 mg/dL



   High Risk:  -189 mg/dL



   Very High Risk:  LDL Greater than 189 mg/dL

 

 51  *******Because ethnic data is not always readily available,



   this report includes an eGFR for both -Americans and



   non- Americans.****



   The National Kidney Disease Education Program (NKDEP) does



   not endorse the use of the MDRD equation for patients that



   are not between the ages of 18 and 70, are pregnant, have



   extremes of body size, muscle mass, or nutritional status,



   or are non- or non-.



   According to the National Kidney Foundation, irrespective of



   diagnosis, the stage of the disease is based on the level of



   kidney function:



   Stage Description                      GFR(mL/min/1.73 m(2))



   1     Kidney damage with normal or decreased GFR       90



   2     Kidney damage with mild decrease in GFR          60-89



   3     Moderate decrease in GFR                         30-59



   4     Severe decrease in GFR                           15-29



   5     Kidney failure                       <15 (or dialysis)

 

 52  HDL Interpretation:



   Undesirable: High Risk:  Less than 40 mg/dL



   Desirable:  Low Risk:  Greater than 60 mg/dL

 

 53  LDL Interpretation:



   Low Risk Optimal Level:  LDL Less than 100 mg/dL



   Near or Above Optimal:  -129 mg/dL



   Borderline High Risk:  -159 mg/dL



   High Risk:  -189 mg/dL



   Very High Risk:  LDL Greater than 189 mg/dL

 

 54  *******Because ethnic data is not always readily available,



   this report includes an eGFR for both -Americans and



   non- Americans.****



   The National Kidney Disease Education Program (NKDEP) does



   not endorse the use of the MDRD equation for patients that



   are not between the ages of 18 and 70, are pregnant, have



   extremes of body size, muscle mass, or nutritional status,



   or are non- or non-.



   According to the National Kidney Foundation, irrespective of



   diagnosis, the stage of the disease is based on the level of



   kidney function:



   Stage Description                      GFR(mL/min/1.73 m(2))



   1     Kidney damage with normal or decreased GFR       90



   2     Kidney damage with mild decrease in GFR          60-89



   3     Moderate decrease in GFR                         30-59



   4     Severe decrease in GFR                           15-29



   5     Kidney failure                       <15 (or dialysis)

 

 55  HDL Interpretation:



   Undesirable: High Risk:  Less than 40 MG/DL



   Desirable:  Low Risk:  Greater than 60 MG/DL

 

 56  LDL Interpretation:



   Low Risk Optimal Level:  LDL Less than 100 MG/DL



   Near or Above Optimal:  -129 MG/DL



   Borderline High Risk:  -159 MG/DL



   High Risk:  -189 MG/DL



   Very High Risk:  LDL Greater than 189 MG/DL







Procedures







 Date  Code  Description  Status

 

 2018  54812  EKG, at Least 12 Leads w/Interpretation and Report  
Completed

 

 05/15/2017  40630  Destruction,Premalignant Lesion,1St Lesion  Completed

 

 05/15/2017  17243  BX Of Skin,Tissue, Mucous Membran  Completed

 

 2016  67980  EKG, at Least 12 Leads w/Interpretation and Report  
Completed

 

 2016  84801  Excision Of Lesion (Trunk,Arm,Leg) .6 To 1 CM.  Completed

 

 2016  [id number]  Colonoscopy  Completed

 

 2014  12036  Incision Of Thrombosed Hemorrhoid,External  Completed

 

 2014  19753  EKG, at Least 12 Leads w/Interpretation and Report  
Completed







Encounters







 Type  Date  Location  Provider  Dx  Diagnosis

 

 Office Visit  2019  Main Office  Abram Clark  J45.998  Other asthma



   11:30a    MD    

 

 Office Visit  10/12/2018  Main Office  Abram Clark,  R09.81  Nasal 
congestion



   1:45p    MD    

 

 Office Visit  2018  Main Office  Abram Clark,  Z00.00  Encntr for 
general



   1:00p    MD    adult medical exam



           w/o abnormal



           findings









 F32.9  Major depressive disorder, single episode, unspecified

 

 E03.9  Hypothyroidism, unspecified

 

 E78.5  Hyperlipidemia, unspecified

 

 K13.79  Other lesions of oral mucosa

 

 Z23  Encounter for immunization









 Office Visit  2018 11:45a  Main Office  Abram Clark  S64.02xA  
Injury of ulnar



       MD    nerve at



           [facility]/hnd lv of



           left arm, init

 

 Office Visit  2018  9:30a  Main Office  Abram Clark  K13.79  Other 
lesions



       MD    of oral mucosa









 Z01.818  Encounter for other preprocedural examination

 

 L02.31  Cutaneous abscess of buttock









 Office Visit  2018  5:15p  Main Office  Abram Clakr,  B37.2  
Candidiasis of



       MD    skin and nail

 

 Office Visit  2018  1:45p  Main Office  Abram Clark,  K13.79  Other 
lesions of



       MD    oral mucosa









 L30.9  Dermatitis, unspecified









 Office Visit  2018  4:30p  Main Office  Nilda AGUDELO64.9  Unspecified



       Storm, FNP-C    hemorrhoids

 

 Office Visit  2018  2:00p  Main Office  Abram  M79.671  Pain in right 
foot



       MD Amber    

 

 Office Visit  2017  1:00p  Main Office  Abram  L30.4  Erythema 
intertrigo



       MD Amber    

 

 Office Visit  2017 11:30a  Main Office  Ginette Littlejohn  M86.271  
Subacute



       FNP-C    osteomyelitis,



           right ankle and



           foot









 R71.8  Other abnormality of red blood cells









 Office Visit  2017  3:00p  Main Office  Ginette Littlejohn  M79.671  Pain 
in right



       FNP-C    foot









 E07.9  Disorder of thyroid, unspecified









 Office Visit  2017  9:45a  Main Office  Ginette Littlejohn  M79.671  Pain 
in right



       FNP-C    foot

 

 Office Visit  2017  8:45a  Main Office  Dominick Lancaster  R05  Cough



       III, FNP-C    

 

 Office Visit  05/15/2017  2:15p  Main Office  Abram Clark,  D48.5  
Neoplasm of



       MD    uncertain



           behavior of skin









 L57.0  Actinic keratosis

 

 S63.610A  Unspecified sprain of right index finger, initial encounter









 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 
fascial



       FNP-C    fibromatosis

 

 Office Visit  2017  9:15a  Main Office  Ginette Littlejohn,  R23.8  Other 
skin changes



       FNP-C    

 

 Office Visit  2017 10:45a  Main Office  Ginette Littlejohn,  L84  Corns and



       FNP-C    callosities

 

 Office Visit  2017  3:45p  Main Office  Dominick Lancaster  H65.02  Acute 
serous otitis



       III, FNP-C    media, left ear

 

 Office Visit  2017  3:00p  Main Office  Ginette Littlejohn,  L05.91  
Pilonidal cyst



       FNP-C    without abscess









 K12.1  Other forms of stomatitis









 Office Visit  2016  9:15a  Main Office  Ginette Littlejohn,  J30.89  Other 
allergic



       FNP-C    rhinitis

 

 Office Visit  2016 11:15a  Main Office  Ginette Littlejohn,  S40.861A  
Insect bite



       FNP-C    (nonvenomous) of



           right upper arm,



           init encntr

 

 Office Visit  10/25/2016  9:00a  Main Office  Dominick Lancaster  L30.9  Dermatitis
,



       III, FNP-C    unspecified

 

 Office Visit  2016 11:30a  Main Office  Dominick Lancaster  K12.2  
Cellulitis and



       III, FNP-C    abscess of mouth

 

 Office Visit  2016  9:00a  Main Office  Dominick Lancaster  L08.9  Local 
infection of



       III, FNP-C    the skin and



           subcutaneous



           tissue, unsp

 

 Office Visit  2016  9:30a  Main Office  Ginette Littlejohn,  Z00.00  Encntr 
for general



       FNP-C    adult medical exam



           w/o abnormal



           findings

 

 Office Visit  2016  2:30p  Main Office  Abram  L30.9  DermatitisAmber MD    unspecified

 

 Office Visit  2015 11:15a  Main Office  Ginette Littlejohn,  I10  Essential



       FNP-C    (primary)



           hypertension









 E78.5  Hyperlipidemia, unspecified

 

 R46.81  Obsessive-compulsive behavior

 

 M54.5  Low back pain









 Office Visit  2015  4:15p  Main Office  Abram  M54.5  Low back pain



       MD Amber    

 

 Office Visit  2015  4:45p  Main Office  Nilda MOYER  J30.9  Allergic 
rhinitis,



       Storm, FNP-C    unspecified

 

 Office Visit  2014 12:15p  Main Office  Ginette Littlejohn,  110.9  
Dermatophytosis



       FNP-C    Unspec Site

 

 Office Visit  10/03/2014 10:30a  Main Office  Ginette Littlejohn,  455.4  
Hemorrhoids External



       FNP-C    Thrombosed

 

 Office Visit  2014  9:15a  Main Office  Ginette Littlejohn,  401.9  
Hypertension Unspec



       FNP-C    

 

 Office Visit  2014  9:00a  Main Office  Ginette Littlejohn,  401.9  
Hypertension Unspec



       FNP-C    









 272.4  Hyperlipidemia Other Unspec









 Office Visit  2014  9:30a  Main Office  Ginette Littlejohn,  455.3  
Hemorrhoids



       FNP-C    External W/O



           Complication

 

 Office Visit  01/10/2014  8:45a  Main Office  Ginette Littlejohn,  401.9  
Hypertension Unspec



       FNP-C    









 272.4  Hyperlipidemia Other Unspec

 

 V04.81  Need For Prophylactic Vaccination & Inoculation/Influenza









 Office Visit  2013 12:00p  Main Office  Nilda MOYER  682.8  Cellulitis & 
Abscess



       Storm, FNP-C    Other Spec Sites

 

 Office Visit  2013 10:00a  Main Office  Ginette Littlejohn,  307.49  Sleep 
Disorder Other



       FNP-C    

 

 Office Visit  10/09/2013 11:30a  Main Office  Ginette Littlejohn,  786.2  Cough



       FNP-C    

 

 Office Visit  2013  9:00a  Main Office  Ginette Littlejohn,  401.9  
Hypertension Unspec



       FNP-C    









 272.4  Hyperlipidemia Other Unspec









 Office Visit  2013 12:15p  Main Office  Hipolito Cotter,  110.3  
Dermatophytosis Groin



       M.D.    & Perianal Area

 

 Office Visit  2013  9:00a  Main Office  Ginette  401.9  Hypertension Unspec



       Armona,    



       FNP-C    









 786.2  Cough









 Office Visit  2013 10:00a  Main Office  Ginette Littlejohn FNP-C  786.2  
Cough









 401.9  Hypertension Unspec









 Office Visit  2013 11:15a  Main Office  Nilda MOYER  455.0  Hemorrhoids 
Internal



       Storm, FNP-C    W/O Complication









 564.00  Constipation Unspecified









 Office Visit  2013 10:00a  Main Office  Ginette Littlejohn FNP-C  786.2  
Cough







Plan of Treatment

2019 - Abram Clark, MDR60.9 Edema, unspecifiedNew Medication:
Hydrochlorothiazide 12.5 mg - 1 tab by mouth every morningComments:Bilateral 
edema, acute on chronic. Lab testing to help determine etiology, in the 
meantime he will try to decrease processed foods and start a low dose diuretic.

## 2019-03-27 NOTE — XMS REPORT
Continuity of Care Document (CCD)

 Created on:2019



Patient:Hipolito Yancey

Sex:Male

:1965

External Reference #:[id number]





Demographics







 Address  [address] [place] [address]

 

 Mobile Phone  [phone]

 

 Email Address  [email or web address]

 

 Preferred Language  en

 

 Marital Status  Not  or 

 

 Yarsanism Affiliation  Unknown

 

 Race  White

 

 Ethnic Group  Not  or 









Author







 Name  Silva Willis









Support







 Name  Relationship  Address  Phone

 

 Jennifer Yancey  Unavailable  Unavailable  [phone]









Care Team Providers







 Name  Role  Phone

 

 Abram Clark MD  Primary Care Physician  Unavailable









Payers







 Date  Identification Numbers  Payment Provider  Subscriber

 

   Policy Number: [id number]  Medicare Blue Ppo  Hipolito Yancey









 Group Number: [id number]  [address] 

 

 PayID:   SUSAAN Chaney [id number]









 Effective: 2017  Policy Number: [id number]  [facility] Ppo  Hipolito Yancey









 Expires: 2017  Group Number: [id number]  [address] 









 PayID: [id number]  SUSANA Aaron [id number]









 Expires: 10/22/2018  Policy Number: [id number]  Medicaid  Hipolito Yancey









 Group Name: 1 [id number]  [address]

 

 PayID: [id number]  [place] [address]







Advance Directives







 Description

 

 No Information Available







Problems







 Description

 

 No Information







Family History







 Date  Family Member(s)  Observation  Comments

 

   General  Heart Disease  

 

   General  Hypertension  

 

   General  Stroke  

 

   General  Cancer  







Social History







 Type  Date  Description  Comments

 

 Birth Sex    Unknown  

 

 Lives With    Alone  

 

 Occupation    Disabled  

 

 Tobacco Use  Start: Unknown  Patient has never smoked  

 

 Smoking Status  Reviewed: 19  Patient has never smoked  







Allergies, Adverse Reactions, Alerts







 Description

 

 No Known Drug Allergies







Medications







 Medication  Date  Status  Form  Strength  Qnty  SIG  Indications  Ordering



                 Provider

 

 Compression    Active  Misc    2Pair    S92.324D  Fredy Hawkins  7          mmhg knee    TIM Martinez



             high    









 R60.0









 Loratadine    Active  Tablets  10mg    Take One    Unknown



             Tablet By    



             Mouth Every    



             Day    

 

 Simvastatin    Active  Tablets  20mg    Take One    Unknown



             Tablet By    



             Mouth AT    



             Bedtime For    



             Cholesterol    

 

 Levocetirizine    Active  Tablets  5mg    Take One    Unknown



 Dihydrochloride            Tablet By    



             Mouth Every    



             Day as Needed    

 

 Mometasone Furoate    Active  Suspension  50mcg/    Spray Two    
Unknown



         Act    Sprays In    



             Each Nostril    



             Every Day    

 

 Ventolin HFA    Active  Aerosol  108(90    Inhale Two    Unknown



         Base)    Puffs By    



         mcg/Ac    Mouth Every 4    



         t    Hours as    



             Needed    

 

 Quetiapine Fumarate    Active  Tablets  300mg    Take Two    Unknown



             Tablets By    



             Mouth Every    



             Day    

 

 Trazodone HCL    Active  Tablets  50mg    Take One    Unknown



             Tablet By    



             Mouth Twice A    



             Day    

 

 Clomipramine HCL    Active  Capsules  50mg    Take One    Unknown



             Capsule By    



             Mouth Every    



             Morning And    



             Three AT    



             Bedtime    

 

 Gabapentin    Active  Capsules  300mg    Take 1   2    Unknown



             Capsules By    



             Mouth Three    



             Times A Day    

 

 Aripiprazole    Active  Tablets  10mg    Take One    Unknown



             Tablet By    



             Mouth Every    



             Day    

 

 Hydroxyzine HCL    Active  Tablets  50mg    Take One    Unknown



             Tablet By    



             Mouth Three    



             Times A Day    

 

 Lorazepam    Active  Tablets  1mg    Take One    Unknown



             Tablet By    



             Mouth Three    



             Times A Day    



             Maximum Daily    



             Dose   3    

 

 Hydrochlorothiazide    Active  Tablets  25mg    Take One    Unknown



             Tablet By    



             Mouth Every    



             Day    

 

                 

 

 Clindamycin HCL  2018 -  Hx  Capsules  300mg  15  take one    Fredy



   2018        ca  tablet by    ivone Martinez  mouth three    M.D.



             times a day    



             for 5 days.    

 

 Probiotic &  2018 -  Hx  Capsules    10  Take one    Fredy



 Acidophilus Formula  2018        ca  tablet daily    Juan



 Extra Strength          ps  by mouth for    M.D.



             10 days.    

 

 Diphenhydramine HCL  2018 -  Hx  Tablets  25mg  30  Take 1 by    Fredy



   2018        ta  mouth three    svitlana Martinez  times a day    M.D.

 

 Oxycodone HCL  2018 -  Hx  Tablets  5mg  15  1 tab by    Alejandro IVERSON



   2018        ta  mouth every 6    svitlana Marie  hours as    MD



             needed for    



             pain.    

 

 Nystatin   -  Hx  Suspension  939134    Swish And    Unknown



   2017      Unit/M    Swallow 5 ML    



         L    By Mouth Four    



             Times A Day    



             For 7 Days    



             Retain In    

 

 Symbicort   -  Hx  Aerosol  160-4.    Inhale Two    Unknown



   2017      5mcg/A    Puffs By    



         ct    Mouth Twice A    



             Day    

 

 Sulfamethoxazole/Trime   -  Hx  Tablets  800-16        lyle Greeneoprim DS  2017      0mg        DILEEP Peng







Medications Administered in Office







 Medication  Date  Status  Form  Strength  Qnty  SIG  Indications  Ordering



                 Provider

 

 Triamcinolone  /  Administered  Injection          Sumanth



 (Kenalog)                MD Mary

 

 Triamcinolone  /  Administered  Injection          Sumanth



 (Kenalog)                MD Mary







Immunizations







 Description

 

 No Information Available







Vital Signs







 Date  Vital  Result  Comment

 

 2019 10:36am  Height  65 inches  5'5"









 Heart Rate  82 /min  

 

 BP Systolic  138 mmHg  

 

 BP Diastolic  80 mmHg  

 

 Respiratory Rate  25 /min  

 

 Body Temperature  98.0 F  

 

 Pain Level  0  









 2019 12:07pm  Height  65 inches  5'5"









 Weight  220.00 lb  

 

 BP Systolic  126 mmHg  

 

 BP Diastolic  78 mmHg  

 

 Pain Level  0  

 

 BMI (Body Mass Index)  36.6 kg/m2  









 2018 10:17am  Height  65 inches  5'5"









 Heart Rate  84 /min  

 

 BP Systolic Sitting  144 mmHg  Lue lg cuff

 

 BP Diastolic Sitting  94 mmHg  Lue lg cuff

 

 Pain Level  0  









 2018  9:09am  Height  65 inches  5'5"









 Heart Rate  76 /min  

 

 BP Systolic  132 mmHg  

 

 BP Diastolic  70 mmHg  

 

 Body Temperature  97.7 F  

 

 Pain Level  0  









 10/23/2018 12:16pm  Heart Rate  80 /min  









 BP Systolic  130 mmHg  

 

 BP Diastolic  82 mmHg  

 

 Body Temperature  97.5 F  

 

 Pain Level  0  









 2018 11:56am  Height  65 inches  5'5"









 Weight  220.00 lb  

 

 Heart Rate  72 /min  

 

 BP Systolic  144 mmHg  

 

 BP Diastolic  72 mmHg  

 

 Respiratory Rate  12 /min  

 

 Pain Level  4  

 

 BMI (Body Mass Index)  36.6 kg/m2  









 2018 11:31am  Height  65 inches  5'5"









 Weight  220.00 lb  

 

 Heart Rate  78 /min  

 

 BP Systolic  146 mmHg  

 

 BP Diastolic  74 mmHg  

 

 Respiratory Rate  12 /min  

 

 Pain Level  5  

 

 BMI (Body Mass Index)  36.6 kg/m2  









 2018 11:29am  Height  65 inches  5'5"









 Weight  220.00 lb  

 

 Heart Rate  84 /min  

 

 BP Systolic Sitting  126 mmHg  

 

 BP Diastolic Sitting  80 mmHg  

 

 Respiratory Rate  16 /min  

 

 Pain Level  0  

 

 BMI (Body Mass Index)  36.6 kg/m2  









 2018 10:28am  Height  65 inches  5'5"









 Weight  220.00 lb  

 

 Heart Rate  67 /min  

 

 Respiratory Rate  15 /min  

 

 Pain Level  2  

 

 BMI (Body Mass Index)  36.6 kg/m2  









 2018 11:39am  Height  65 inches  5'5"









 Weight  220.00 lb  

 

 Heart Rate  72 /min  

 

 BP Systolic Sitting  134 mmHg  

 

 BP Diastolic Sitting  84 mmHg  

 

 Respiratory Rate  19 /min  

 

 Body Temperature  97.0 F  

 

 Pain Level  0  

 

 BMI (Body Mass Index)  36.6 kg/m2  









 05/10/2018 10:56am  Height  65 inches  5'5"









 Weight  220.00 lb  

 

 BP Systolic  134 mmHg  

 

 BP Diastolic  82 mmHg  

 

 Respiratory Rate  20 /min  

 

 Body Temperature  97.7 F  

 

 Pain Level  0  

 

 BMI (Body Mass Index)  36.6 kg/m2  









 2018 11:44am  Height  65 inches  5'5"









 Weight  220.00 lb  

 

 BP Systolic  142 mmHg  

 

 BP Diastolic  80 mmHg  

 

 Respiratory Rate  18 /min  

 

 Pain Level  0  

 

 BMI (Body Mass Index)  36.6 kg/m2  









 2018 11:42am  Heart Rate  74 /min  









 BP Systolic  138 mmHg  

 

 BP Diastolic  78 mmHg  

 

 Respiratory Rate  14 /min  

 

 Body Temperature  98.3 F  

 

 Pain Level  0  









 2018 11:18am  Heart Rate  88 /min  









 BP Systolic  130 mmHg  

 

 BP Diastolic  80 mmHg  

 

 Respiratory Rate  16 /min  

 

 Body Temperature  97.2 F  









 2018  9:29am  Height  65 inches  5'5"









 Weight  215.00 lb  

 

 Heart Rate  94 /min  

 

 Respiratory Rate  15 /min  

 

 Body Temperature  98.4 F  

 

 Pain Level  6  

 

 BMI (Body Mass Index)  35.8 kg/m2  









 2018  9:46am  Height  65 inches  5'5"









 Weight  215.00 lb  

 

 BP Systolic  122 mmHg  

 

 BP Diastolic  68 mmHg  

 

 Respiratory Rate  20 /min  

 

 Body Temperature  97.8 F  

 

 Pain Level  5  

 

 BMI (Body Mass Index)  35.8 kg/m2  









 2018 11:27am  Height  65 inches  5'5"









 Weight  215.00 lb  

 

 Heart Rate  88 /min  

 

 Respiratory Rate  17 /min  

 

 Body Temperature  98.1 F  

 

 Pain Level  0  

 

 BMI (Body Mass Index)  35.8 kg/m2  









 2018 11:33am  Height  65 inches  5'5"









 Weight  215.00 lb  

 

 Heart Rate  83 /min  

 

 Respiratory Rate  14 /min  

 

 Body Temperature  97.3 F  

 

 Pain Level  5  

 

 BMI (Body Mass Index)  35.8 kg/m2  









 2018 10:00am  Height  65 inches  5'5"









 Weight  215.00 lb  

 

 Heart Rate  79 /min  

 

 BP Systolic  125 mmHg  

 

 BP Diastolic  85 mmHg  

 

 Body Temperature  96.5 F  

 

 BMI (Body Mass Index)  35.8 kg/m2  









 2017 11:56am  Height  65 inches  5'5"









 Weight  226.00 lb  

 

 Heart Rate  82 /min  

 

 Respiratory Rate  18 /min  

 

 Body Temperature  98.1 F  

 

 Pain Level  0  

 

 BMI (Body Mass Index)  37.6 kg/m2  









 2017  1:21pm  Height  65 inches  5'5"









 Weight  226.00 lb  

 

 BP Systolic  120 mmHg  

 

 BP Diastolic  70 mmHg  

 

 Respiratory Rate  20 /min  

 

 Pain Level  0  

 

 BMI (Body Mass Index)  37.6 kg/m2  









 2017 11:07am  Heart Rate  68 /min  









 BP Systolic Sitting  118 mmHg  

 

 BP Diastolic Sitting  90 mmHg  

 

 Body Temperature  97.2 F  









 2017 10:37am  Heart Rate  72 /min  









 BP Systolic  115 mmHg  

 

 BP Diastolic  90 mmHg  

 

 Body Temperature  96.7 F  









 2017 11:54am  Height  65 inches  5'5"









 Weight  226.00 lb  

 

 Heart Rate  80 /min  

 

 BP Systolic Sitting  138 mmHg  

 

 BP Diastolic Sitting  96 mmHg  

 

 Respiratory Rate  14 /min  

 

 Body Temperature  97.5 F  

 

 BMI (Body Mass Index)  37.6 kg/m2  









 2017 10:45am  Height  65 inches  5'5"









 Weight  218.00 lb  

 

 Heart Rate  78 /min  

 

 BP Systolic  132 mmHg  

 

 BP Diastolic  92 mmHg  

 

 Respiratory Rate  18 /min  

 

 Body Temperature  97.4 F  

 

 BMI (Body Mass Index)  36.3 kg/m2  









 2017  4:00pm  Height  65 inches  5'5"









 Weight  221.00 lb  

 

 Heart Rate  72 /min  

 

 BP Systolic Sitting  120 mmHg  

 

 BP Diastolic Sitting  84 mmHg  

 

 Respiratory Rate  14 /min  

 

 Body Temperature  99.0 F  

 

 BMI (Body Mass Index)  36.8 kg/m2  









 2017 11:09am  Height  65 inches  5'5"









 Weight  218.00 lb  

 

 Heart Rate  90 /min  

 

 BP Systolic  134 mmHg  

 

 BP Diastolic  86 mmHg  

 

 Respiratory Rate  18 /min  

 

 Body Temperature  97.7 F  

 

 Pain Level  5  

 

 BMI (Body Mass Index)  36.3 kg/m2  









 2017  9:59am  Height  64 inches  5'4"









 Weight  215.00 lb  

 

 Heart Rate  62 /min  

 

 BP Systolic  116 mmHg  

 

 BP Diastolic  82 mmHg  

 

 Respiratory Rate  16 /min  

 

 Body Temperature  96.9 F  

 

 BMI (Body Mass Index)  36.9 kg/m2  







Results







 Test  Date  Facility  Test  Result  H/L  Range  Note

 

 Laboratory test  2018  [facility]  Surgical  SEE RESULT      1



 finding    [address]  Pathology  [place]      



     [place] [address] [phone]          

 

 CBC Auto Diff  2018  [facility]  White Blood  11.3  High  3.5-
10.8  



     [address]  Count  10^3/uL      



     [place] [address] [phone]          









 Red Blood Count  5.79 10^6/uL  High  4.0-5.4  

 

 Hemoglobin  17.1 g/dL  N  14.0-18.0  

 

 Hematocrit  51 %  N  42-52  

 

 Mean Corpuscular Volume  88 fL  N  80-94  

 

 Mean Corpuscular Hemoglobin  30 pg  N  27-31  

 

 Mean Corpuscular HGB Conc  34 g/dL  N  31-36  

 

 Red Cell Distribution Width  14 %  N  10.5-15  

 

 Platelet Count  235 10^3/uL  N  150-450  

 

 Mean Platelet Volume  8 um3  N  7.4-10.4  

 

 Abs Neutrophils  8.6 10^3/uL  High  1.5-7.7  

 

 Abs Lymphocytes  1.6 10^3/uL  N  1.0-4.8  

 

 Abs Monocytes  0.9 10^3/uL  High  0-0.8  

 

 Abs Eosinophils  0.1 10^3/uL  N  0-0.6  

 

 Abs Basophils  0.1 10^3/uL  N  0-0.2  

 

 Abs Nucleated RBC  0.1 10^3/uL      

 

 Granulocyte %  76.2 %  N  38-83  

 

 Lymphocyte %  14.4 %  Low  25-47  

 

 Monocyte %  7.8 %  High  0-7  

 

 Eosinophil %  1.0 %  N  0-6  

 

 Basophil %  0.6 %  N  0-2  

 

 Nucleated Red Blood Cells %  0.7      









 HIV 1/2 AB  2017  [facility]  HIV 1 2  Nonreactive  N  
Nonreactive  2



 Evaluation    [address]  Antibody        



     [place] [address] [phone]          

 

 CBC Auto Diff  2017  [facility]  White Blood  8.9 10^3/uL  N  
3.5-10.8  



     [address]  Count        



     [place] [address] [phone]          









 Red Blood Count  6.13 10^6/uL  High  4.0-5.4  

 

 Hemoglobin  18.4 g/dL  High  14.0-18.0  

 

 Hematocrit  56 %  High  42-52  

 

 Mean Corpuscular Volume  91 fL  N  80-94  

 

 Mean Corpuscular Hemoglobin  30 pg  N  27-31  

 

 Mean Corpuscular HGB Conc  33 g/dL  N  31-36  

 

 Red Cell Distribution Width  14 %  N  10.5-15  

 

 Platelet Count  220 10^3/uL  N  150-450  

 

 Mean Platelet Volume  8 um3  N  7.4-10.4  

 

 Abs Neutrophils  5.7 10^3/uL  N  1.5-7.7  

 

 Abs Lymphocytes  2.1 10^3/uL  N  1.0-4.8  

 

 Abs Monocytes  1.0 10^3/uL  High  0-0.8  

 

 Abs Eosinophils  0.2 10^3/uL  N  0-0.6  

 

 Abs Basophils  0 10^3/uL  N  0-0.2  

 

 Abs Nucleated RBC  0.01 10^3/uL  N    

 

 Granulocyte %  63.5 %  N  38-83  

 

 Lymphocyte %  23.5 %  Low  25-47  

 

 Monocyte %  10.8 %  High  1-9  

 

 Eosinophil %  1.8 %  N  0-6  

 

 Basophil %  0.4 %  N  0-2  

 

 Nucleated Red Blood Cells %  0.1  N    









 Laboratory  2017  [facility]  Hepatitis C  Nonreactive  N  
Nonreactive  



 test finding    [address]  Antibody        



     [place] [address] [phone]          









 C Reactive Protein  2.52 mg/L  N  < 5.00  3









 Laboratory test  2017  [facility]  Erythrocyte Sed  0 mm/Hr  
N  0-20  4



 finding    [address]  Rate        



     [place] [address] [phone]          









 Lyme Disease Serology  Negative  N  Negative  5

 

 Vitamin D, 1,25 Dihydroxy  33 pg/mL  N  18-64  6









 CBC Auto Diff  2017  [facility]  White Blood  9.5 10^3/uL  N  
3.5-10.8  



     [address]  Count        



     [place] [address] [phone]          









 Red Blood Count  6.50 10^6/uL  High  4.0-5.4  

 

 Hemoglobin  19.8 g/dL  High  14.0-18.0  

 

 Hematocrit  61 %  High  42-52  

 

 Mean Corpuscular Volume  94 fL  N  80-94  

 

 Mean Corpuscular Hemoglobin  31 pg  N  27-31  

 

 Mean Corpuscular HGB Conc  33 g/dL  N  31-36  

 

 Red Cell Distribution Width  15 %  N  10.5-15  

 

 Platelet Count  206 10^3/uL  N  150-450  

 

 Mean Platelet Volume  9 um3  N  7.4-10.4  

 

 Abs Neutrophils  6.2 10^3/uL  N  1.5-7.7  

 

 Abs Lymphocytes  1.8 10^3/uL  N  1.0-4.8  

 

 Abs Monocytes  1.2 10^3/uL  High  0-0.8  

 

 Abs Eosinophils  0.2 10^3/uL  N  0-0.6  

 

 Abs Basophils  0.1 10^3/uL  N  0-0.2  

 

 Abs Nucleated RBC  0.08 10^3/uL  N    

 

 Granulocyte %  65.2 %  N  38-83  

 

 Lymphocyte %  19.2 %  Low  25-47  

 

 Monocyte %  12.7 %  High  1-9  

 

 Eosinophil %  2.2 %  N  0-6  

 

 Basophil %  0.7 %  N  0-2  

 

 Nucleated Red Blood Cells %  0.9  N    









 Laboratory test  2017  [facility]  C Reactive  18.21 mg/L  
High  < 5.00  7



 finding    [address]  Protein        



     [place], [place] [address] [phone]          









 1  SEE RESULT BELOW



   -----------------------------------------------------------------------------
---------------



   Name:  HIPOLITO YANCEY                 : 1965    Attend Dr: 
Alvin Rosales MD



   Acct:  [id number]  Unit: [id number]  AGE: 52            Location:  OR



   Re18                        SEX: M             Status:    GELY RANGEL



   -----------------------------------------------------------------------------
---------------



   



   SPEC: [id number]             YASMEEN: 18-          SUBM DR: Alvin Rosales MD



   REQ:  [id number]             RECD: 



   STATUS: SOUT



   _



   ORDERED:  LEVEL 4, IMMUNO-FIRST



   



   FINAL DIAGNOSIS



   



   



   Tongue, right lateral aspect, biopsy:



   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  See 
comment.



   



   



   



   Comment: The sections demonstrates a somewhat sessile acutely



   inflamed verrucoid squamous proliferative lesion with focal



   architectural complexity and ulceration.  While focally



   suggestive, definitive invasion is not seen.  Dysplasia is



   difficult to evaluate due to the degree of acute inflammation



   and reactive changes.  The lesion extends to the unoriented



   inked surgical margin.  A p16 stain performed with appropriate



   controls on block B is positive confirming an HPV related



   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



   though and inflamed and traumatized verrucoid carcinoma cannot



   be entirely excluded.  Conservative reexcision or other



   ablation of this lesion may be considered.



   Alternatively,close clinical follow-up is recommended.



   



   



   Dr. Hall has reviewed this case and concurs.



   



   



   PRE-OPERATIVE DIAGNOSIS



   



   Right tongue lesion



   



   



   



   



   



   ** CONTINUED ON NEXT PAGE **



   



   DEPARTMENT OF PATHOLOGY,  [address] [place] [address]



   Phone # [phone]      Fax #[phone]



   Liam Hall M.D. Director     [facility] # [id number]



   



   



   



   RUN DATE: 18               [facility] **LIVE**         
       PAGE    2



   



   -----------------------------------------------------------------------------
---------------



   Patient: HIPOLITO YANCEY M                  [id number]     (Continued)



   -----------------------------------------------------------------------------
---------------



   



   GROSS DESCRIPTION          (Continued)



   



   



   GROSS DESCRIPTION



   



   The specimen is received in formalin labeled, Right Lateral Aspect of Tongue
, and consists



   of a 1.5 x 1.4 by up to 0.6 cm tan-gray ovoid wrinkled irregular soft tissue 
fragment which



   is inked, serially sectioned and submitted entirely in two cassettes.



   



   Signed by and Reported on: __________              Liam Hall MD  [id number]



   



   -----------------------------------------------------------------------------
---------------



   



   



   



   



   



   



   



   



   



   



   



   



   



   



   



   



   



   



   



   



   



   



   



   



   



   



   



   



   



   



   



   



   



   ** END OF REPORT **



   



   DEPARTMENT OF PATHOLOGY,  [address] [place] [address]



   Phone # [phone]      Fax #[phone]



   Liam Hall M.D. Director     [facility] # [id number]

 

 2  It is recognized that currently available assays for the



   detection of antibodies to HIV-1 and/or HIV-2 may not detect



   all infected individuals. HIV antibodies may be undetectable



   in some stages of the infection and in some clinical



   conditions. The performance of this assay has not been



   established for populations of infants or children.



   



   Assayed by Chemiluminescence Microparticle Immunoassay on



   the Siemens Advia Centaur CP. Values obtained with different



   methods or kits cannot be used interchangeably.The



   diagnostic specificity of the ADVIA Centaur 1/O/2 Enhanced



   assay in the low risk population was 99.90% (6052/6058) with



   a 95% confidence interval of 99.78 to 99.96%.

 

 3  Acute inflammation:  >10.00

 

 4  [id number]

 

 5  Serologic response to B. burgdorferi infection is not



   detected, but cannot rule out early infection during



   which low or undetectable antibody levels to



   B. burgdorferi may be present. If clinically indicated,



   a new serum specimen should be submitted in 7-14 days.



   Test Performed by:



   [address] [address]

 

 6  -------------------ADDITIONAL INFORMATION-------------------



   This test was developed and its performance characteristics



   determined by [facility] in a manner consistent with CLIA



   requirements. This test has not been cleared or approved by



   the U.S. Food and Drug Administration.



   Test Performed by:



   [facility] - [address] [address]

 

 7  Acute inflammation:  >10.00







Procedures







 Date  Code  Description  Status

 

 2018  44305  Repair Nonunion/Malunion Metatarsal  Completed

 

 2018  94455  Repair Nonunion/Malunion Metatarsal  Completed

 

 2018  45791  Injection Intralesional Up To And Including 7 Lesions  
Completed

 

 2018  00799  Injection Intralesional Up To And Including 7 Lesions  
Completed

 

 12/15/2017  01377  Dest Lesion Each Addl Lesion 2 Through 14 Each  Completed

 

 12/15/2017  77550  Destruction ALL Benign Or Premalignant Lesion (Other Than  
Completed



     [place]  

 

 2017  80122  Destruction ALL Benign Or Premalignant Lesion (Other Than  
Completed



     [place]  

 

 2017  14646  FX Metatarsal Care  Completed

 

 2017  24941  Repair Immediate Wound 2.6-5CM  Completed



     Face/Ear/Eyelid/Nose/Lip/Muc Mem  

 

 2017  99173  Excise Malig Lesion 1.1-2CM Face/Ear/Eyelid/Nose/Lip  
Completed







Encounters







 Type  Date  Location  Provider  Dx  Diagnosis

 

 Office Visit  2019  [facility]  Sumanth Hernandez MD  L82.1  Other 
seborrheic



   10:00a        keratosis









 D22.9  Melanocytic nevi, unspecified

 

 Z08  Encntr for follow-up exam after trtmt for malignant neoplasm

 

 Z85.828  Personal history of other malignant neoplasm of skin









 Office Visit  2019 11:30a  Orthopedic  Fredy  S92.324K  Nondisp fx of



     Services Of  TIM Martinez    2nd metatarsal



     C.M.A.      bone, r ft, 7thK

 

 Office Visit  2018 10:30a  Orthopedic  Fredy  S92.324K  Nondisp fx of



     Services Of  TIM Martinez    2nd metatarsal



     C.M.A.      bone r ft, 7thK

 

 Office Visit  2018  8:45a  Orthopedic  Fredy  S92.324K  Nondisp fx of



     Services Of  TIM Martinez    2nd metatarsal



     C.M.A.      bone, r ft, 7thK

 

 Office Visit  10/23/2018 11:30a  Orthopedic  Fredy  S92.324K  Nondisp fx of



     Services Of  TIM Martinez    2nd metatarsal



     C.M.ANanci      bone, r ft, K

 

 Office Visit  2018 11:30a  Orthopedic  Fredy  S92.324G  Nondisp fx of



     Services Of  TIM Martinez    2nd metatarsal



     C.M.A.      bone, r ft, 

 

 Office Visit  2018 11:30a  Orthopedic  Fredy  S92.324K  Nondisp fx of



     Services Of  TIM Martinez    2nd metatarsal



     C.M.A.      bone, r ft, 7thK

 

 Office Visit  2018 11:15a  Orthopedic  Fredy  S92.324G  Nondisp fx of



     Services Of  TIM Martinez    2nd metatarsal



     C.M.A.      bone, r ft, 

 

 Office Visit  2018 11:30a  [facility]  Sumanth Hernandez,  L90.5  Scar 
conditions



       MD    and fibrosis of



           skin









 R60.0  Localized edema

 

 D22.5  Melanocytic nevi of trunk

 

 Z80.8  Family history of malignant neoplasm of organs or systems

 

 Z08  Encntr for follow-up exam after trtmt for malignant neoplasm

 

 Z85.828  Personal history of other malignant neoplasm of skin









 Office Visit  2018  [facility]  Fredy  S92.324K  Nondisp fx of



   10:15a  [facility] M.[facility]    2nd metatarsal



           bone, r ft, K

 

 Office Visit  2018  [facility]  [place]  R60.0  Localized edema



   11:30a  [facility] Maxwell MD    

 

 Office Visit  03/15/2018  [facility]  L03.115  Cellulitis of



   10:43a  roxy Kapadia M.D.    right lower limb



     Hospitalists      

 

 Office Visit  2018  Or[facility]opedic  Fredy  S92.324G  Nondisp fx of



   11:15a  [facility] M.[facility]    2nd metatarsal



           bone, r ft, G

 

 Office Visit  2018  Or[facility]opedic  Fredy CARR2.324D  Nondisp fx of



   9:45a  [facility] M[facility]    2nd metatarsal



           bone, r ft, 7thD

 

 Office Visit  2018  [facility]  Sumanth Hernandez,  L21.8  Other 
seborrheic



   11:30a    MD    dermatitis









 L90.5  Scar conditions and fibrosis of skin









 Office Visit  2017  [facility]  [place] Maxwell,  S92.324S  Nondisp fx of



   11:30a  [facility]  MD palencia



     [facility]      metatarsal bone,



           right foot,



           sequela









 M25.474  Effusion, right foot









 Office Visit  2017  1:15p  Orthopedic  Fabiana Etienne,  S92.324D  Nondisp 
fx of



     [facility]  KIM    2nd metatarsal



     [facility]      bone, r ft, 7thD

 

 Office Visit  2017 11:30a  [facility]  Dilip BYRD  R60.0  Localized 
edema



     For [facility]  Macqueen, M.D.    



     Diseases      

 

 Office Visit  2017  4:00p  [facility]  Dilip BYRD  R60.0  Localized 
edema



     For Infectious  Maclarissaen, M.D.    



     Diseases      









 S92.324A  Nondisp fx of second metatarsal bone, right foot, init

 

 D45  Polycythemia vera

 

 S92.324D  Nondisp fx of 2nd metatarsal bone, r ft, 7thD









 Office Visit  2017  3:20p  [facility]  Sumanth Hernandez,  C44.91  Basal 
cell



       MD    carcinoma of



           skin, unspecified

 

 Office Visit  2017  9:45a  Surgical  Ananda VICKERS  L05.92  Pilonidal sinus



     [facility]  MD Ignacio,    without abscess



       FACS    







Plan of Treatment

Future Appointment(s):2019 10:45 am - Fredy Martinez M.D. at [facility]2019 10:45 am - KIM Malone at [facility]2019 10:45 am - Fredy Martinez M.D. at [facility]2019 - Fredy Martinez M.D.T84.84xA Pain due to 
internal orthopedic prosthetic devices, implantsFollow up:10-14 days postop

## 2019-03-27 NOTE — ED
Respiratory





- HPI Summary


HPI Summary: 


53-year-old male presents with sore throat and cough today.  He states his 

symptoms started earlier today.  He states has lungs feels very congested.  He 

admits shortness of breath and no chest pain.  Denies any bowel pain.  No 

nausea or vomiting.  He states he has having difficulty swallowing.  He just 

had surgery on his foot 2 days ago.  He is not currently on antibiotics.  He 

has a history of anxiety and asthma. he also has rash across back.  no new 

soaps or products. He denies any pain or swelling in his calf muscles.  He 

states his throat is bothering him the most and his mouth feels raw. 





- History of Current Complaint


Chief Complaint: EDUpperRespComplaint


Stated Complaint: SEVERE CONGESTION, RAW THROAT PER PT


Time Seen by Provider: 03/27/19 22:03


Pain Intensity: 9





- Allergy/Home Medications


Allergies/Adverse Reactions: 


 Allergies











Allergy/AdvReac Type Severity Reaction Status Date / Time


 


ragweed pollen Allergy Intermediate Congestion Verified 03/27/19 21:25


 


environmental Allergy  Congestion Uncoded 03/27/19 21:25


 


Hayfever Allergy  Congestion Uncoded 03/27/19 21:25














PMH/Surg Hx/FS Hx/Imm Hx


Endocrine/Hematology History: Reports: Hx Thyroid Disease - on meds


   Denies: Hx Diabetes, Hx Anemia - blood levels too high, has blood taken to 

thin out


Cardiovascular History: 


   Denies: Hx Congestive Heart Failure, Hx Hypertension, Hx Pacemaker/ICD, 

Other Cardiovascular Problems/Disorders


Respiratory History: Reports: Hx Asthma, Hx Sleep Apnea - no machine


GI History: 


   Denies: Hx Gastroesophageal Reflux Disease, Other GI Disorders


 History: 


   Denies: Hx Renal Disease, Other  Problems/Disorders


Musculoskeletal History: Reports: Hx Arthritis - hands,, Other Musculoskeletal 

History - broke back 15 years ago


   Denies: Hx Rheumatoid Arthritis, Hx Osteoporosis


Sensory History: Reports: Hx Contacts or Glasses - reading


   Denies: Hx Cataracts, Hx Glaucoma, Hx Hearing Aid


Opthamlomology History: Reports: Hx Contacts or Glasses - reading


   Denies: Hx Cataracts, Hx Glaucoma


Neurological History: Reports: Hx Migraine - history of, none recent, Other 

Neuro Impairments/Disorders - Hx LUMBAR Fx


Psychiatric History: Reports: Hx Anxiety, Hx Depression


   Denies: Hx Panic Disorder





- Cancer History


Hx Chemotherapy: No





- Surgical History


Surgery Procedure, Year, and Place: PILONIDAL CYST(LOWER BACK) X 4.  CYST ON 

LEFT ELBOW REMOVED.  BASAL CELL REMOVED FROM RIGHT CHEEK.  RT FT 2ND METATARSAL 

REPAIR 3/2018


Hx Anesthesia Reactions: No


Infectious Disease History: No


Infectious Disease History: 


   Denies: Traveled Outside the [place] in Last 30 Days





- Family History


Known Family History: 


   Negative: Cardiac Disease, Hypertension, Diabetes





- Social History


Alcohol Use: None


Hx Substance Use: No


Substance Use Type: Reports: None


Substance Use Comment - Amount & Last Used: one drink every two weeks


Hx Tobacco Use: No


Smoking Status (MU): Never Smoked Tobacco





Review of Systems


Negative: Fever


Negative: Chest Pain


Positive: Shortness Of Breath, Cough


Positive: Rash


All Other Systems Reviewed And Are Negative: Yes





Physical Exam


Triage Information Reviewed: Yes


Vital Signs On Initial Exam: 


 Initial Vitals











Temp Pulse Resp BP Pulse Ox


 


 99.6 F   104   16   140/88   97 


 


 03/27/19 21:20  03/27/19 21:20  03/27/19 21:20  03/27/19 21:20  03/27/19 21:20











Vital Signs Reviewed: Yes


Appearance: Positive: Well-Appearing


Skin: Positive: Other - redness across torso


Head/Face: Positive: Normal Head/Face Inspection


Eyes: Positive: Normal, Conjunctiva Clear


ENT: Positive: Pharyngeal erythema, TMs normal, Uvula midline, Other - soft 

palate symmetric as much as can see, hoarse voice, limited visiblity due to 

large tongue.  Negative: Trismus, Muffled voice


Respiratory/Lung Sounds: Positive: Clear to Auscultation, Breath Sounds Present

, Wheezes - mild wheezing noted at base


Cardiovascular: Positive: Normal, RRR


Abdomen Description: Positive: Nontender, Soft


Bowel Sounds: Positive: Present


Musculoskeletal: Positive: Normal


Neurological: Positive: Normal


Psychiatric: Positive: Normal





Diagnostics





- Vital Signs


 Vital Signs











  Temp Pulse Resp BP Pulse Ox


 


 03/27/19 21:20  99.6 F  104  16  140/88  97














- Laboratory


Result Diagrams: 


 03/27/19 22:36





 03/27/19 23:45


Lab Statement: Any lab studies that have been ordered have been reviewed, and 

results considered in the medical decision making process.





- Radiology


  ** chest


Radiology Interpretation Completed By: ED Physician


Summary of Radiographic Findings: no pneumonia





- CT


  ** neck


CT Interpretation Completed By: Radiologist


Summary of CT Findings: IMPRESSION:  No acute findings. No evidence of a 

tonsillar or peritonsillar abscess. No.  significant submucosal abnormality. 

The patient could still have a mucosal.  abnormality which is not detected by 

CT which could be an explanation for the.  patient's sore throat.





- EKG


  ** No standard instances


Cardiac Rate: Tachycardia


EKG Rhythm: Sinus Tachycardia


Summary of EKG Findings: sinus tachycardia





Re-Evaluation





- Re-Evaluation


  ** First Eval


Re-Evaluation Time: 22:59


Change: Improved





  ** Second Eval


Re-Evaluation Time: 23:55


Comment: started to destat





  ** Third Eval


Re-Evaluation Time: 00:43


Change: Worse


Comment: patient became diaphoretic





  ** Fourth Eval


Re-Evaluation Time: 01:46


Comment: patient no longer diaphoretic. some wheezing noted again





Disposition





- Course


Course Of Treatment: 53-year-old male presents with sore throat and cough 

today.  He states his symptoms started earlier today.  He states has lungs 

feels very congested.  He admits shortness of breath but no chest pain.  Denies 

any bowel pain.  No nausea or vomiting.  He states he has having difficulty 

swallowing.  He just had surgery on his foot 2 days ago.  He is not currently 

on antibiotics.  He has a history of anxiety and asthma. he also has rash 

across back.  no new soaps or products.  On exam pharynx is unable to assess 

well due to large tongue.  Mouth appears dry.  Lungs some wheezing noted.  EKG 

shows sinus tachycardia.  wbc elevated at 12.  CRP elevated.  Troponin .02.  D-

dimer negative.  Strep and flu negative. since d-dimer negative did not get CTA 

as more considered with the hoarsiness that is a throat pathology that can not 

accurately assess.  gave benadryl and solumedrol incase was having an allergic 

reaction and patient rash did not change. was feeling better after steriod and 

breathing treatment. Patient is very anxious and became anxious while in the 

ED.  Patient started to diaphoresis.  He states that this is his normal 

response to anxiety.  gave gabapentin and Ativan and diaphoresis resolved. neck 

CT shows no acute findings. patient o2 on room air 88-90 will give another 

breathing treatment and some magnesium.  attempted to walk patient and he 

dropped to 85 on room air. patient denies any shortness of breath and is 

feeling better but is requiring 02 to mantain stats.  unclear if decrease stats 

are due to PE, asthma or pneumonia. discussed case with dr garduno.





- Differential Dx - Cardiopulmonary


Differential Diagnoses - Cardiopulmonary: Lower Resp Infection, Pulmonary 

Embolism, Other - pharyngitis





- Diagnoses


Provider Diagnoses: 


 Asthma, Upper respiratory infection, Shortness of breath








Discharge





- Sign-Out/Discharge


Documenting (check all that apply): Patient Departure





- Discharge Plan


Condition: Stable


Disposition: ADMITTED TO [facility]


Referrals: 


Abram Clark MD [Primary Care Provider] - 





- Billing Disposition and Condition


Condition: STABLE


Disposition: Admitted to [facility]

## 2019-03-27 NOTE — XMS REPORT
Continuity of Care Document (CCD)

 Created on:2019



Patient:Hipolito Yancey

Sex:Male

:1965

External Reference #:[id number]





Demographics







 Address  [address]



   [address]



   [place] [address]

 

 Home Phone  [phone]

 

 Mobile Phone  [phone]

 

 Preferred Language  en

 

 Marital Status  Not  or 

 

 Lutheran Affiliation  Unknown

 

 Race  White

 

 Ethnic Group  Not  or 









Author







 Name  Abram Clark MD

 

 Address  [address]



   Unavailable



   [place] [address]









Care Team Providers







 Name  Role  Phone

 

 Abram Clark MD  Care Team Information   Unavailable









Payers







 Date  Identification Numbers  Payment Provider  Subscriber

 

 Effective:  Policy Number: [id number]  [facility]  Hipolito Yancey



 2015    [employer]  









 PayID: [id number]  [address] [address]









 [place] [address]









 Expires: 10/31/2015  Policy Number: [id number]  Medicare - [place]  Hipolito Yancey









 PayID: [id number]  [address]









 [place] [address]









 Expires: 2018  Policy Number: [id number]  Medicaid After Medicare  Hipolito Yancey









 Group Name: 2 1  [address]

 

 PayID: [id number]  [place] [address]







Advance Directives







 Type  Date  Description  Status  Comment

 

 Other Directive  2011  Health Care Proxy  Current and Verified  

 

 Other Directive  2011  Power Of   Current and Verified  POA







Problems







 Description

 

 No Information







Family History







 Date  Family Member(s)  Observation  Comments

 

   Father  Healthy  

 

   Mother  CAD  

 

   Mother  Cancer, Breast  

 

   Mother  Scoliosis  







Social History







 Type  Date  Description  Comments

 

 Birth Sex    Unknown  

 

 Marital Status    Single  

 

 Lives With    Alone  

 

 Smoke-Free    Home is smoke-free  

 

 Occupation    Disabled  

 

 Occupation    [employer] Years  

 

 Tobacco Use  Start: Unknown  Never Smoked Cigarettes  

 

 ETOH Use    Denies alcohol use  

 

 Recreational Drug Use    Denies Drug Use  

 

 Tobacco Use  Start: Unknown  Patient has never smoked  

 

 Guns in Home    No  







Allergies, Adverse Reactions, Alerts







 Date  Description  Reaction  Status  Severity  Comments

 

 2018  Pollen    Active    

 

 2018  Ragweed    Active    

 

 2018  Aretha Goldenrod Pollen Extract    Active    

 

 2018  Dandelion Extract    Active    

 

 2013  NKDA    Inactive    







Medications







 Medication  Date  Status  Form  Strength  Qnty  SIG  Indications  Ordering



                 Provider

 

 Vitamin C  03/15  Active        once daily                  MD Amber

 

 Iron  03/15  Active        once daily                  MD Amber

 

 Hydrochlorothiazi    Active  Tablets  25mg  30tab  1 by mouth  R60.9  
wnt        s  every day    MERCEDES GusmanP-VERÓNICA

 

 Ventolin HFA    Active  Aerosol  108(90Bas  8gm  1-2 puffs          e)    four times    Heetderks



         mcg/Act    a day as    , MD



             needed    

 

 Semprex-D    Active  Capsules  8-60mg  60cap  Take One            s  Capsule By    COMFORT Gray



             Mouth Four    FNP-C



             Times A    



             Day    

 

 Sudafed 12 Hour  10/12  Active  Tablets ER  120mg  30tab  1 tab by  R09.81  
    12HR    s  mouth    Heetderks



             twice a    , MD



             day as    



             needed    

 

 Fluticasone  10/12  Active  Suspension  50mcg/Act  16gm  2 sprays    Abram



 Propionate            into each    Heetderks



             nostril    , MD



             once daily    

 

 Mucinex    Active  Tablets ER  600mg  60tab  take one        12HR    s  tablet by    COMFORT Gray



             mouth    FNP-C



             every 12    



             hours as    



             needed for    



             mucus    

 

 Xyzal Allergy    Active  Tablets  5mg  60tab  1 tab by    Abram



 24H        s  mouth as    Heetderks



             needed    , MD

 

 Clotrimazole/Beta    Active  Cream  1-0.05%  15gm  1 apply to  B37.2  
Abram



 methasone            affected    Heetderks



 Dipropionate            area twice    , MD



             a day for    



             2 weeks    

 

 Proctozone-HC    Active  Cream  2.5%  30gm  apply to  K64.9            hemorrhoid    sreekanth Gusman 3 - 4    FNP-C



             times    



             daily if    



             needed    

 

 Lidocaine-Priloca    Active  Cream  2.5-2.5%  25gm  apply pea  K64.9  




 ine            sized area    COMFORT Gray,



             3 to 4    FNP-C



             times    



             daily for    



             pain    

 

 Levothroid    Active  Tablets  88mcg        Ginette



   /MERCEDES VoP-VERÓNICA

 

 Lorazepam    Active  Tablets  1mg    1 po tid    Ginette              DILEEP [place]-C

 

 Trazodone HCL    Active  Tablets  50mg  30tab  take 1    Ginette



           s  tablet po    Ramona,



             bid    FNP-C

 

 Simvastatin    Active  Tablets  20mg  90tab  take one    Abram



           s  tablet by    Amber retana at    , MD



             bedtime    



             for    



             cholestero    



             l    

 

 Montelukast    Active  Tablets  10mg  30tab  Take One  R05  Abram



 Sodium          s  Tablet By    Amber



             Mouth AT    , MD



             Bedtime    

 

 Abilify    Active  Tablets  10mg    1 po hs    Unknown



   /0000              

 

 Hydroxyzine HCL    Active  Tablets  50mg  60tab  1 tab po    Unknown



   /0000        s  tid    

 

 Clomipramine HCL    Active  Capsules  50mg    1 tab po    Unknown



   /0000          bid    

 

 Androgel    Active  Gel  25mg/2.5G    apply 1    Unknown



   /      M    packet to    



             skin once    



             daily    

 

 Quetiapine    Active  Tablets  300mg    take 2    Unknown



 Fumarate  /0000          tablets po    



             q hs    

 

 Gabapentin    Active  Capsules  300mg  90cap  1 tablet    Unknown



   /0000        s  po tid    

 

 Hydrocodone    Active  Solution  7.5-325mg        Ruparelia



 Bitartrate/Acetam  /      /15ML        Alvin M.D.

 

 Trazodone HCL    Active  Tablets  50mg    Take One    Unknown



   /0000          Tablet By    



             Mouth    



             Twice A    



             Day    

 

                 

 

 Hydrochlorothiazi    Hx  Tablets  12.5mg  30tab  1 tab by  R60.9  Abram



         s  mouth    Amber



   -          MD shelby



   03/05          morning    



   /2019              

 

 Zyrtec Allergy    Hx  Capsules  10mg  30cap  1 by mouth    Abram        s  every day    Amber Best MD



                 

 

 Econazole Nitrate    Hx  Cream  1%  30gm  apply              twice a    Ramona,



   -          day to    [facility]



             groin rash    



             as    



             directed    



             for 2-4    



             weeks    

 

 Clotrimazole/Beta    Hx  Cream  1-0.05%  15gm  1 apply to  L30.4  Abram



 methasone            affected    Amber Paintingionate  -          area twice    , MD



   05/21          a day for    



             1 week    

 

 Semprex-D  10/05  Hx  Capsules  8-60mg  60cap  take one    Abram



           s  capsule by    Amber



   -          mouth MD laura



             times a    



             day    

 

 Bactrim DS    Hx  Tablets  800-160mg  20tab  1 by mouth  M86.271          s  twice a    Ramona,



   -          day x 10    FNP-C



   02/21          days    



   /2018              

 

 Meloxicam    Hx  Tablets  15mg  30tab  1 by mouth  M72.2          s  every day,    Ramona,



   -          take with    FNP-C



   05/21          food    



   /2018              

 

 Mucinex    Hx  Tablets ER  600mg  60tab  take 1  R05      12HR    s  tablet by    Ramona,



   -          mouth    FNP-C



   10/05          twice           day as    



             needed for    



             cough and    



             to think    



             mucus..dri    



             nk extra    



             water    

 

 Meloxicam  05/10  Hx  Tablets  7.5mg  60tab  take 1  M72.2          s  tablet by    Ramona,



   -          mouth    FNP-C



   07/03          twice    



   /2017          daily with    



             food for    



             back pain    

 

 Keflex    Hx  Capsules  500mg  10cap  1 tab by            s  mouth    Ramona,



   -          twice a    FNP-C



   04/18          day    



   /2017              

 

 Augmentin    Hx  Tablets  875-125mg  20tab  1 by mouth  L05.91          s  twice a    Ramona,



   -          day for    FNP-C



             infection,    



             take for    



             10 days    

 

 Ibuprofen    Hx  Tablets  600mg  120ta  take one            bs  tablet by    Ramona,



   -          mouth 4    FNP-C



   05/21          times    



   /2018          daily with    



             food as    



             needed for    



             pain    

 

 Loratadine    Hx  Tablets  10mg  30tab  1 by mouth            s  every day    Ramona,



   -              FNP-C



                 

 

 Doxycycline    Hx  Capsules  100mg  2caps  2 tablets  S40.861A  Ginette



 Monohydrate            by mouth    Ramona,



   -          now    FNP-C



                 

 

 Triamcinolone  10/25  Hx  Cream  0.1%  15gm  apply to  L30.9  Dominick



 Acetonide            affected    [place]



   -          area 2-3    III,



             times    FNP-C



             daily    

 

 Cephalexin    Hx  Tablets  500mg  14tab  1 tablet  K12.2          s  by mouth    Anisha



   -          twice    III,



             daily for    FNP-C



             7 days    

 

 Keflex    Hx  Capsules  500mg  4caps  1 tab by    Abram



             mouth    Heetderks



   -          twice a    , MD



   12/28          day    



   /2016              

 

 Mupirocin    Hx  Ointment  2%  22gm  apply to  L08.9            affected    Anisha



   -          area 3    III,



             times    FNP-C



             daily for    



             7 days    

 

 Diazepam    Hx  Tablets  5mg  2tabs  take one              tablet 1    Ramona,



   -          hour prior    FNP-C



             to    



             procedure.    



             may repeat    



             x1    

 

 Zyrtec Allergy    Hx  Tablets  10mg  30tab  1 by mouth            s  every day    Ramona,



   -          as needed    FNP-C



   01/27          for    



   /2016          allergies    

 

 Mucinex    Hx  Tablets ER  600mg  60tab  take 1  J30.9      12HR    s  tablet by    COMFORT Gray,



   -          mouth    FNP-C



             twice a    



             day as    



             needed for    



             cough and    



             to think    



             mucus..dri    



             nk extra    



             water    

 

 Econazole Nitrate    Hx  Cream  1%  15gm  apply to  110.9            groin area    Ramona,



   -          twice a    FNP-C



   05/10          day    



   /2017              

 

 Valium    Hx  Tablets  2mg  2tabs  1 by mouth              30 min    COMFORT Gray,



   -          prior to    FNP-C



             procedure,    



             may repeat    



             in one    



             hour if    



             needed    

 

 Anusol-HC  10/22  Hx  Cream  2.5%  30uni  Apply To  455.0          ts  Rectum    COMFORT Gray,



   -          Three    FNP-C



             Times           Day as    



             Needed    

 

 Oxycodone HCL    Hx  Tablets  5mg  5five  1 by mouth  455.4            q6hr as    Ramona,



   -          needed    FNP-C



             severe    



   /2015          pain    

 

 Lisinopril    Hx  Tablets  5mg  30tab  1 by mouth            s  every day    Ramona,



   -          replaces    FNP-C



             10 mg dose    



   /              

 

 Sulfamethoxazole/    Hx  Tablets  800-160mg  20tab  1 po bid  682.8  
Shawnti



 Trimethoprim         s  for    COMFORT Gray,



   -          infection,    FNP-C



             take for    



             10 days    

 

 Fluticasone    Hx  Suspension  50mcg/Act  16gm  2 sprays    Ginette



 Propionate  /2013          into each    Ramona,



   -          nostril    FNP-C



             once daily    



                 

 

 Miralax    Hx  Packet  [id number]  1mont  1 packet  564.00  nt        h  po daily    COMFORT Gray,



   -          for    FNP-C



             stools,    



             can    



             increase    



             to bid if    



             needed    

 

 Proctocare-HC    Hx  Cream  2.5%  28.35  Apply To  455.0          units  Rectum    COMFORT Gray,



   -          Three    FNP-C



   10/22          Times           Day as    



             Needed    

 

 Lisinopril    Hx  Tablets  10mg  30tab  Take One            s  Tablet By    COMFORT Gray,



   -          Mouth    FNP-C



             Every Day    



                 

 

 Cetirizine HCL    Hx  Tablets  10mg  30tab  1 po qd  786.2  Ginette        s      Ramona,



   -              FNP-C



                 

 

 Levothroid    Hx  Tablets  75mcg    1 tab po q    Celzo,



   /0000          am on    Renetta



   -          empty    



             stomach           hour prior    



             to meal    



             and other    



             meds    

 

 Loratadine-D 24HR  00  Hx  Tablets ER  10-240mg  90tab  take one    Ginette    24HR    s  tablet by    Ramona,



   -          mouth    FNP-C



             every day    



                 







Immunizations







 CPT Code  Status  Date  Vaccine  Lot #

 

 [id number]  Given  2018  Influenza Virus Vaccine, Quadrivalent, 3 Yr >  
[id number]



       Quad, Preserv Free  

 

 [id number]  Given  10/01/2017  Influenza Virus Vaccine, Quadrivalent, 3 Yr >  



       Quad, Preserv Free  

 

 [id number]  Given  2017  Tdap (Adacel)  [id number]

 

 [id number]  Given  10/11/2016  Influenza Virus Vaccine, Quadrivalent, 3 Yr >  



       Quad, Preserv Free  

 

 [id number]  Given  10/11/2016  Prevnar-13 Pneumococcal Conjugate Vaccine  

 

 [id number]  Given  10/30/2015  Influenza Virus Vaccine, Quadrivalent, 3 Yr >  



       Quad, Preserv Free  

 

 [id number]  Given  01/10/2014  Influenza Vaccine-Preservative Free 3 Yrs And  
[id number]



       Above  







Vital Signs







 Date  Vital  Result  Comment

 

 03/15/2019 10:50am  Weight  251.00 lb  









 Weight  113.854 kg  

 

 BP Systolic  108 mmHg  

 

 BP Diastolic  70 mmHg  

 

 Heart Rate  72 /min  

 

 Body Temperature  98.6 F  

 

 Respiratory Rate  16 /min  









 2019  2:43pm  Weight  259.00 lb  









 Weight  117.482 kg  

 

 BP Systolic  128 mmHg  

 

 BP Diastolic  80 mmHg  

 

 Heart Rate  90 /min  

 

 Body Temperature  97.6 F  

 

 Respiratory Rate  18 /min  

 

 O2 % BldC Oximetry  96 %  









 2019 11:35am  Weight  255.00 lb  









 Weight  115.668 kg  

 

 BP Systolic  150 mmHg  

 

 BP Diastolic  90 mmHg  

 

 Heart Rate  92 /min  

 

 Body Temperature  97.9 F  

 

 Respiratory Rate  16 /min  

 

 O2 % BldC Oximetry  97 %  









 10/12/2018  1:54pm  Weight  237.00 lb  









 Weight  107.503 kg  

 

 BP Systolic  120 mmHg  

 

 BP Diastolic  78 mmHg  

 

 Heart Rate  80 /min  

 

 Body Temperature  97.7 F  

 

 Respiratory Rate  16 /min  

 

 O2 % BldC Oximetry  97 %  









 2018  1:07pm  Weight  237.00 lb  









 Weight  107.503 kg  

 

 BP Systolic  118 mmHg  

 

 BP Diastolic  74 mmHg  

 

 Heart Rate  84 /min  

 

 Body Temperature  97.3 F  

 

 Respiratory Rate  16 /min  

 

 Height  63 inches  5'3"

 

 BMI (Body Mass Index)  42.0 kg/m2  









 2018 11:57am  Weight  252.00 lb  









 Weight  114.307 kg  

 

 BP Systolic  102 mmHg  

 

 BP Diastolic  74 mmHg  

 

 Heart Rate  88 /min  

 

 Body Temperature  98.1 F  

 

 Respiratory Rate  16 /min  









 2018  9:39am  Weight  256.00 lb  









 Weight  116.122 kg  

 

 BP Systolic  124 mmHg  

 

 BP Diastolic  78 mmHg  

 

 Heart Rate  92 /min  

 

 Body Temperature  97.5 F  

 

 Respiratory Rate  20 /min  

 

 O2 % BldC Oximetry  98 %  









 2018  5:13pm  Weight  244.00 lb  









 Weight  110.678 kg  

 

 BP Systolic  130 mmHg  

 

 BP Diastolic  84 mmHg  

 

 Heart Rate  84 /min  

 

 Body Temperature  98.5 F  

 

 Height  64 inches  5'4"

 

 BMI (Body Mass Index)  41.9 kg/m2  

 

 O2 % BldC Oximetry  93 %  









 2018  1:53pm  Weight  236.00 lb  









 Weight  107.050 kg  

 

 BP Systolic  136 mmHg  

 

 BP Diastolic  98 mmHg  

 

 Heart Rate  88 /min  

 

 Body Temperature  97.4 F  

 

 Respiratory Rate  18 /min  

 

 O2 % BldC Oximetry  98 %  









 2018  4:28pm  Weight  233.00 lb  









 Weight  105.689 kg  

 

 BP Systolic  112 mmHg  

 

 BP Diastolic  78 mmHg  

 

 Heart Rate  92 /min  

 

 Body Temperature  97.3 F  

 

 Respiratory Rate  16 /min  









 2018  2:04pm  Weight  226.00 lb  









 Weight  102.514 kg  

 

 BP Systolic  120 mmHg  

 

 BP Diastolic  80 mmHg  

 

 Heart Rate  80 /min  

 

 Body Temperature  97.3 F  

 

 Respiratory Rate  15 /min  

 

 O2 % BldC Oximetry  98 %  









 2017  1:10pm  Weight  223.00 lb  









 Weight  101.153 kg  

 

 BP Systolic  110 mmHg  

 

 BP Diastolic  72 mmHg  

 

 Heart Rate  80 /min  

 

 Body Temperature  97.6 F  

 

 O2 % BldC Oximetry  97 %  









 2017 11:41am  Weight  218.00 lb  









 Weight  98.885 kg  

 

 BP Systolic  110 mmHg  

 

 BP Diastolic  78 mmHg  

 

 Heart Rate  96 /min  

 

 Body Temperature  98.1 F  

 

 Respiratory Rate  20 /min  

 

 O2 % BldC Oximetry  98 %  









 2017  3:11pm  Weight  223.00 lb  









 Weight  101.153 kg  

 

 BP Systolic  120 mmHg  

 

 BP Diastolic  80 mmHg  

 

 Heart Rate  84 /min  









 2017 10:08am  Weight  219.00 lb  









 Weight  99.338 kg  

 

 BP Systolic  120 mmHg  

 

 BP Diastolic  70 mmHg  

 

 Heart Rate  76 /min  

 

 Body Temperature  97.8 F  









 2017  9:00am  Weight  216.00 lb  









 Weight  97.978 kg  

 

 BP Systolic  100 mmHg  

 

 BP Diastolic  74 mmHg  

 

 Heart Rate  82 /min  

 

 Body Temperature  98.3 F  

 

 Respiratory Rate  16 /min  

 

 O2 % BldC Oximetry  98 %  









 2017 10:55am  Weight  219.00 lb  









 Weight  99.338 kg  

 

 BP Systolic  90 mmHg  

 

 BP Diastolic  64 mmHg  

 

 Heart Rate  86 /min  

 

 Body Temperature  97.9 F  

 

 Respiratory Rate  16 /min  









 2017 11:11am  Weight  220.00 lb  









 Weight  99.792 kg  

 

 BP Systolic  112 mmHg  

 

 BP Diastolic  70 mmHg  

 

 Heart Rate  72 /min  

 

 Body Temperature  98.2 F  

 

 Respiratory Rate  16 /min  









 05/15/2017  2:29pm  Weight  215.00 lb  









 Weight  97.524 kg  

 

 BP Systolic  126 mmHg  

 

 BP Diastolic  80 mmHg  

 

 Heart Rate  94 /min  

 

 Body Temperature  96.7 F  

 

 Respiratory Rate  20 /min  

 

 O2 % BldC Oximetry  97 %  









 05/10/2017 11:02am  Weight  221.00 lb  









 Weight  100.246 kg  

 

 BP Systolic  122 mmHg  

 

 BP Diastolic  82 mmHg  

 

 Heart Rate  90 /min  

 

 Body Temperature  98.6 F  

 

 Respiratory Rate  18 /min  

 

 O2 % BldC Oximetry  95 %  









 2017  9:42am  Weight  222.00 lb  









 Weight  100.699 kg  

 

 BP Systolic  116 mmHg  

 

 BP Diastolic  80 mmHg  

 

 Heart Rate  86 /min  

 

 Body Temperature  97.4 F  

 

 Respiratory Rate  16 /min  

 

 O2 % BldC Oximetry  96 %  









 2017 10:59am  Weight  216.00 lb  









 Weight  97.978 kg  

 

 BP Systolic  120 mmHg  

 

 BP Diastolic  74 mmHg  

 

 Heart Rate  76 /min  

 

 Body Temperature  97.9 F  

 

 Respiratory Rate  16 /min  









 2017  3:48pm  Weight  217.00 lb  









 Weight  98.431 kg  

 

 BP Systolic  120 mmHg  

 

 BP Diastolic  80 mmHg  

 

 Heart Rate  80 /min  

 

 Body Temperature  97.2 F  

 

 Respiratory Rate  16 /min  









 2017  3:04pm  Weight  220.00 lb  









 Weight  99.792 kg  

 

 BP Systolic  98 mmHg  

 

 BP Diastolic  76 mmHg  

 

 Heart Rate  100 /min  

 

 Body Temperature  98.0 F  

 

 Respiratory Rate  18 /min  

 

 O2 % BldC Oximetry  96 %  









 2016  9:37am  Weight  218.00 lb  









 Weight  98.885 kg  

 

 BP Systolic  130 mmHg  

 

 BP Diastolic  85 mmHg  

 

 Heart Rate  84 /min  

 

 Body Temperature  97.1 F  









 2016 10:30am  Weight  210.00 lb  









 Weight  95.256 kg  

 

 BP Systolic  110 mmHg  

 

 BP Diastolic  80 mmHg  

 

 Heart Rate  80 /min  

 

 Body Temperature  97.2 F  

 

 Respiratory Rate  16 /min  









 10/25/2016  9:04am  Weight  202.00 lb  









 Weight  91.627 kg  

 

 BP Systolic  132 mmHg  

 

 BP Diastolic  94 mmHg  

 

 Heart Rate  80 /min  

 

 Body Temperature  97.6 F  

 

 Respiratory Rate  20 /min  

 

 O2 % BldC Oximetry  97 %  









 2016  8:59am  Weight  206.00 lb  









 Weight  93.442 kg  

 

 BP Systolic  116 mmHg  

 

 BP Diastolic  72 mmHg  

 

 Heart Rate  88 /min  

 

 Body Temperature  97.5 F  

 

 Respiratory Rate  17 /min  

 

 O2 % BldC Oximetry  97 %  









 2016 11:11am  Weight  205.00 lb  









 Weight  92.988 kg  

 

 BP Systolic  100 mmHg  

 

 BP Diastolic  70 mmHg  

 

 Heart Rate  76 /min  

 

 Body Temperature  97.8 F  

 

 Respiratory Rate  20 /min  









 2016 10:05am  Weight  203.00 lb  









 Weight  92.081 kg  

 

 BP Systolic  120 mmHg  

 

 BP Diastolic  76 mmHg  

 

 Heart Rate  100 /min  

 

 Body Temperature  97.8 F  

 

 Respiratory Rate  20 /min  









 2016  9:00am  Weight  207.00 lb  









 Weight  93.895 kg  

 

 BP Systolic  118 mmHg  

 

 BP Diastolic  80 mmHg  

 

 Heart Rate  76 /min  

 

 Body Temperature  97.8 F  

 

 Respiratory Rate  20 /min  









 2016  9:23am  Weight  211.00 lb  









 Weight  95.710 kg  

 

 BP Systolic  108 mmHg  

 

 BP Diastolic  78 mmHg  

 

 Heart Rate  80 /min  

 

 Height  63 inches  5'3"

 

 BMI (Body Mass Index)  37.4 kg/m2  









 2016  2:39pm  Weight  214.00 lb  









 Weight  97.070 kg  

 

 BP Systolic  117 mmHg  

 

 BP Diastolic  86 mmHg  

 

 Heart Rate  84 /min  

 

 Body Temperature  96.8 F  









 2015 11:11am  Weight  218.00 lb  









 Weight  98.885 kg  

 

 BP Systolic  112 mmHg  

 

 BP Diastolic  80 mmHg  

 

 Heart Rate  92 /min  









 2015  3:50pm  Weight  220.00 lb  









 Weight  99.792 kg  

 

 BP Systolic  118 mmHg  

 

 BP Diastolic  78 mmHg  

 

 Heart Rate  80 /min  









 2015  4:41pm  Weight  220.00 lb  









 Weight  99.792 kg  

 

 BP Systolic  100 mmHg  

 

 BP Diastolic  70 mmHg  

 

 Heart Rate  108 /min  

 

 Body Temperature  98.7 F  

 

 O2 % BldC Oximetry  98 %  









 2014 12:40pm  Weight  192.00 lb  









 Weight  87.091 kg  

 

 BP Systolic  94 mmHg  

 

 BP Diastolic  66 mmHg  

 

 Heart Rate  88 /min  

 

 Body Temperature  97.4 F  









 10/03/2014 10:31am  Weight  185.00 lb  









 Weight  83.916 kg  

 

 BP Systolic  102 mmHg  

 

 BP Diastolic  66 mmHg  

 

 Heart Rate  68 /min  









 2014  8:12am  Weight  181.00 lb  









 Weight  82.102 kg  

 

 BP Systolic  102 mmHg  

 

 BP Diastolic  70 mmHg  

 

 Heart Rate  76 /min  









 2014  8:50am  BP Systolic  108 mmHg  









 BP Diastolic  54 mmHg  









 2014  9:00am  Weight  185.00 lb  









 Weight  83.916 kg  

 

 BP Systolic  110 mmHg  

 

 BP Diastolic  60 mmHg  

 

 Heart Rate  76 /min  









 2014  8:51am  Weight  196.00 lb  









 Weight  88.906 kg  

 

 BP Systolic  90 mmHg  repeat 98/68

 

 BP Diastolic  70 mmHg  repeat 98/68

 

 Heart Rate  76 /min  









 2014  9:48am  Weight  210.00 lb  









 Weight  95.256 kg  

 

 BP Systolic  100 mmHg  

 

 BP Diastolic  74 mmHg  

 

 Heart Rate  104 /min  

 

 Body Temperature  97.7 F  









 01/10/2014  8:40am  Weight  208.00 lb  









 Weight  94.349 kg  

 

 BP Systolic  120 mmHg  

 

 BP Diastolic  86 mmHg  

 

 Heart Rate  112 /min  









 2013 11:55am  Weight  209.00 lb  









 Weight  94.802 kg  

 

 BP Systolic  98 mmHg  

 

 BP Diastolic  66 mmHg  

 

 Heart Rate  104 /min  

 

 Body Temperature  97.5 F  









 2013 10:13am  Weight  210.00 lb  









 Weight  95.256 kg  

 

 BP Systolic  100 mmHg  

 

 BP Diastolic  60 mmHg  

 

 Heart Rate  80 /min  









 10/09/2013 12:12pm  Weight  206.00 lb  









 Weight  93.442 kg  

 

 BP Systolic  104 mmHg  

 

 BP Diastolic  76 mmHg  

 

 Heart Rate  96 /min  

 

 Body Temperature  98.6 F  

 

 O2 % BldC Oximetry  98 %  level at rest









 2013  9:00am  Weight  206.00 lb  









 Weight  93.442 kg  

 

 BP Systolic  102 mmHg  

 

 BP Diastolic  74 mmHg  

 

 Heart Rate  104 /min  

 

 Body Temperature  97.1 F  

 

 O2 % BldC Oximetry  98 %  









 2013 12:44pm  Weight  202.00 lb  









 Weight  91.627 kg  

 

 BP Systolic  110 mmHg  

 

 BP Diastolic  70 mmHg  

 

 Heart Rate  100 /min  

 

 Body Temperature  97.7 F  









 2013  8:58am  Weight  201.00 lb  









 Weight  91.174 kg  

 

 BP Systolic  110 mmHg  repeat 110/82

 

 BP Diastolic  90 mmHg  repeat 110/82

 

 Heart Rate  98 /min  

 

 Body Temperature  98.7 F  

 

 O2 % BldC Oximetry  97 %  AT Room Air









 2013 10:06am  Weight  210.75 lb  









 Weight  95.596 kg  

 

 BP Systolic  108 mmHg  

 

 BP Diastolic  84 mmHg  

 

 Heart Rate  92 /min  

 

 Body Temperature  97.9 F  

 

 O2 % BldC Oximetry  94 %  AT Room Air









 2013 11:19am  Weight  203.00 lb  









 Weight  92.081 kg  

 

 BP Systolic  104 mmHg  

 

 BP Diastolic  78 mmHg  

 

 Heart Rate  80 /min  









 2013  9:51am  Weight  198.00 lb  









 Weight  89.813 kg  

 

 BP Systolic  100 mmHg  

 

 BP Diastolic  70 mmHg  

 

 Heart Rate  110 /min  

 

 Body Temperature  98.7 F  

 

 Height  64.5 inches  5'4.50"

 

 BMI (Body Mass Index)  33.5 kg/m2  

 

 O2 % BldC Oximetry  98 %  AT Room Air







Results







 Test  Date  Facility  Test  Result  H/L  Range  Note

 

 Laboratory test  03/15/2019  [facility]  Ferritin  10.7 
ng/mL  Low    1



 finding    [phone]          

 

 CBC No Diff  03/15/2019  [facility]  White Blood  9.7 10^
3/uL  N  3.5-10.8  



     [phone]  Count        









 Red Blood Count  6.31 10^6/uL  High  4.18-5.48  

 

 Hemoglobin  12.7 g/dL  Low  14.0-18.0  

 

 Hematocrit  42 %  N  36-46  

 

 Mean Corpuscular Volume  66 fL  Low  80-94  2

 

 Mean Corpuscular Hemoglobin  20 pg  Low  27-31  

 

 Mean Corpuscular HGB Conc  31 g/dL  N  31-36  

 

 Red Cell Distribution Width  21 %  High  10.5-15  

 

 Platelet Count  359 10^3/uL  N  150-450  

 

 Mean Platelet Volume  8.7 fL  N  7.4-10.4  









 Iron & Iron Binding  03/15/2019  [facility]  Iron  71 g
/dL  N    



 Capacity    [phone]          









 Unsaturated Iron Binding  < 514 g/dL      

 

 Total Iron Binding Capacity  529 g/dL  High  250-450  

 

 Transferrin  378 mg/dL  High  203-362  

 

 % Iron Saturation  13 %  Low  15-55  









 Comp Metabolic Panel  2019  [facility]  Sodium  
138 mmol/L  N  135-145  



     [phone]          









 Potassium  4.5 mmol/L  N  3.5-5.0  

 

 Chloride  105 mmol/L  N  101-111  

 

 Co2 Carbon Dioxide  27 mmol/L  N  22-32  

 

 Anion Gap  6 mmol/L  N  2-11  

 

 Glucose  89 mg/dL  N    

 

 Blood Urea Nitrogen  15 mg/dL  N  6-24  

 

 Creatinine  0.90 mg/dL  N  0.67-1.17  

 

 BUN/Creatinine Ratio  16.7  N  8-20  

 

 Calcium  9.1 mg/dL  N  8.6-10.3  

 

 Total Protein  6.8 g/dL  N  6.4-8.9  

 

 Albumin  4.2 g/dL  N  3.2-5.2  

 

 Globulin  2.6 g/dL  N  2-4  

 

 Albumin/Globulin Ratio  1.6  N  1-3  

 

 Total Bilirubin  0.20 mg/dL  N  0.2-1.0  

 

 Alkaline Phosphatase  85 U/L  N    

 

 Alt  16 U/L  N  7-52  

 

 Ast  16 U/L  N  13-39  

 

 Egfr Non-  87.9    >60  

 

 Egfr   106.4    >60  3









 CBC Auto  2019  [facility]  White Blood  11.1 10^3/
uL  High  3.5-10.8  



 Diff    [phone]  Count        









 Red Blood Count  6.05 10^6/uL  High  4.00-5.40  

 

 Hemoglobin  12.1 g/dL  Low  14.0-18.0  

 

 Hematocrit  40 %  Low  42-52  

 

 Mean Corpuscular Volume  65 fL  Low  80-94  4

 

 Mean Corpuscular Hemoglobin  20 pg  Low  27-31  

 

 Mean Corpuscular HGB Conc  31 g/dL  N  31-36  

 

 Red Cell Distribution Width  21 %  High  10.5-15  

 

 Platelet Count  370 10^3/uL  N  150-450  

 

 Mean Platelet Volume  7.9 fL  N  7.4-10.4  

 

 Abs Neutrophils  7.8 10^3/uL  High  1.5-7.7  

 

 Abs Lymphocytes  1.7 10^3/uL  N  1.0-4.8  

 

 Abs Monocytes  1.3 10^3/uL  High  0-0.8  

 

 Abs Eosinophils  0.3 10^3/uL  N  0-0.6  

 

 Abs Basophils  0.1 10^3/uL  N  0-0.2  

 

 Abs Nucleated RBC  0 10^3/uL      

 

 Granulocyte %  70.1 %      

 

 Lymphocyte %  15.7 %      

 

 Monocyte %  11.3 %      

 

 Eosinophil %  2.3 %      

 

 Basophil %  0.6 %      

 

 Nucleated Red Blood Cells %  0.1      









 Laboratory test  2019  [facility]  TSH (Thyroid  
0.96 mcIU/mL  N  0.34-5.60  5



 finding    [phone]  [facility])        









 B-Type Natriuretic Peptide BNP  9 pg/mL    <=100  6









 Laboratory test  2018  [facility]  TSH (Thyroid  
0.87 mcIU/mL  N  0.34-5.60  7



 finding    [phone]  [facility])        









 Free T4 (Free Thyroxine)  0.57 ng/dL  Low  0.61-1.12  8









 Lipid Profile  2018  [facility]  Triglycerides  
230 mg/dL      9



 (Trig/Chol/HDL)    [phone]          









 Cholesterol  153 mg/dL      10

 

 HDL Cholesterol  38.2 mg/dL      11

 

 LDL Cholesterol  69 mg/dL      12









 Laboratory  2018  [facility]  Hepatitis C  
Nonreactive    Nonreactive  13



 test finding    [phone]  Antibody        

 

 Comp Metabolic  03/15/2018  [facility]  Sodium  133 mmol/
L  N  133-145  



 Panel    [phone]          









 Potassium  4.0 mmol/L  N  3.5-5.0  

 

 Chloride  100 mmol/L  Low  101-111  

 

 Co2 Carbon Dioxide  25 mmol/L  N  22-32  

 

 Anion Gap  8 mmol/L  N  2-11  

 

 Glucose  92 mg/dL  N    

 

 Blood Urea Nitrogen  12 mg/dL  N  6-24  

 

 Creatinine  0.92 mg/dL  N  0.67-1.17  

 

 BUN/Creatinine Ratio  13.0  N  8-20  

 

 Calcium  9.6 mg/dL  N  8.6-10.3  

 

 Total Protein  7.0 g/dL  N  6.4-8.9  

 

 Albumin  3.8 g/dL  N  3.2-5.2  

 

 Globulin  3.2 g/dL  N  2-4  

 

 Albumin/Globulin Ratio  1.2  N  1-3  

 

 Total Bilirubin  1.00 mg/dL  N  0.2-1.0  

 

 Alkaline Phosphatase  84 U/L  N    

 

 Alt  19 U/L  N  7-52  

 

 Ast  23 U/L  N  13-39  

 

 Egfr Non-  86.1    >60  

 

 Egfr   110.7    >60  14









 Laboratory test  03/15/2018  [facility]  C Reactive  
146.74 mg/L  High  < 5.00  15



 finding    [phone]  Protein        

 

 CBC Auto Diff  03/15/2018  [facility]  White Blood  12.3
  High  3.5-10.8  



     [phone]  Count  10^3/uL      









 Red Blood Count  6.07 10^6/uL  High  4.0-5.4  

 

 Hemoglobin  17.7 g/dL  N  14.0-18.0  

 

 Hematocrit  53 %  High  42-52  

 

 Mean Corpuscular Volume  87 fL  N  80-94  

 

 Mean Corpuscular Hemoglobin  29 pg  N  27-31  

 

 Mean Corpuscular HGB Conc  34 g/dL  N  31-36  

 

 Red Cell Distribution Width  14 %  N  10.5-15  

 

 Platelet Count  197 10^3/uL  N  150-450  

 

 Mean Platelet Volume  8 um3  N  7.4-10.4  

 

 Abs Neutrophils  9.0 10^3/uL  High  1.5-7.7  

 

 Abs Lymphocytes  1.5 10^3/uL  N  1.0-4.8  

 

 Abs Monocytes  1.5 10^3/uL  High  0-0.8  

 

 Abs Eosinophils  0.2 10^3/uL  N  0-0.6  

 

 Abs Basophils  0.1 10^3/uL  N  0-0.2  

 

 Abs Nucleated RBC  0 10^3/uL      

 

 Granulocyte %  73.0 %  N  38-83  

 

 Lymphocyte %  12.6 %  Low  25-47  

 

 Monocyte %  12.2 %  High  0-7  

 

 Eosinophil %  1.8 %  N  0-6  

 

 Basophil %  0.4 %  N  0-2  

 

 Nucleated Red Blood Cells %  0.1      









 Laboratory test  03/15/2018  [facility]  Lactic Acid  
1.6 mmol/L  N  0.5-2.0  16



 finding    [phone]          

 

 Inr/Protime  03/15/2018  [facility]  Inr  0.99  N  0.77-
1.02  



     [phone]          

 

 Laboratory test  03/15/2018  [facility]  Partial  30.3 
seconds  N  26.0-36.3  



 finding    [phone]  Thrombo Time        



       PTT        









 Blood Culture  SEE RESULT BELOW      17









 CBC Auto  2018  [facility]  White Blood  11.3 10^3/
uL  High  3.5-10.8  



 Diff    [phone]  Count        









 Red Blood Count  5.79 10^6/uL  High  4.0-5.4  

 

 Hemoglobin  17.1 g/dL  N  14.0-18.0  

 

 Hematocrit  51 %  N  42-52  

 

 Mean Corpuscular Volume  88 fL  N  80-94  

 

 Mean Corpuscular Hemoglobin  30 pg  N  27-31  

 

 Mean Corpuscular HGB Conc  34 g/dL  N  31-36  

 

 Red Cell Distribution Width  14 %  N  10.5-15  

 

 Platelet Count  235 10^3/uL  N  150-450  

 

 Mean Platelet Volume  8 um3  N  7.4-10.4  

 

 Abs Neutrophils  8.6 10^3/uL  High  1.5-7.7  

 

 Abs Lymphocytes  1.6 10^3/uL  N  1.0-4.8  

 

 Abs Monocytes  0.9 10^3/uL  High  0-0.8  

 

 Abs Eosinophils  0.1 10^3/uL  N  0-0.6  

 

 Abs Basophils  0.1 10^3/uL  N  0-0.2  

 

 Abs Nucleated RBC  0.1 10^3/uL      

 

 Granulocyte %  76.2 %  N  38-83  

 

 Lymphocyte %  14.4 %  Low  25-47  

 

 Monocyte %  7.8 %  High  0-7  

 

 Eosinophil %  1.0 %  N  0-6  

 

 Basophil %  0.6 %  N  0-2  

 

 Nucleated Red Blood Cells %  0.7      









 Hemoglobin/Hematacrit  2017  [facility]  
Hemoglobin  17.5  N  14.0-18.0  



     [phone]    g/dL      









 Hematocrit  52 %  N  42-52  









 Laboratory test  2017  [facility]  Rheumatoid 
Factor  <15 IU/mL  N  <15  18



 finding    [phone]          









 Erythrocyte Sed Rate  0 mm/Hr  N  0-20  19

 

 C Reactive Protein  18.21 mg/L  High  < 5.00  20









 Lyme Western  2017  [facility]  Lyme Disease  
Negative  N  Negative  



 Blot    [phone]  IgG Ab WB        









 Lyme Disease IgG Bands Present  No bands detecte <SEE NOTE> kDa  N    21

 

 Lyme Disease IgM Ab WB  Negative  N  Negative  

 

 Lyme Disease IgM Bands Present  No bands detecte <SEE NOTE> kDa  N    22

 

 Lyme Disease Interpretation  See Comment  N    23









 Tick-Borne Panel  2017  [facility]  Babesia  
Negative  N  Negative  



 PCR Blood    [phone]  microti PCR        









 Babesia ducani  Negative  N  Negative  

 

 Babesia divergens/Mo-1  Negative  N  Negative  24

 

 Anaplasma phagocytophilum  Negative  N  Negative  

 

 Ehrlichia chaffeensis  Negative  N  Negative  

 

 Ehrlichia ewingii/canis  Negative  N  Negative  

 

 Ehrlichia muris-like  Negative  N  Negative  25

 

 B. miyamotoi PCR, B  Negative  N  Negative  26









 CBC Auto Diff  2017  [facility]  White Blood  9.5 
10^3/uL  N  3.5-10.8  



     [phone]  Count        









 Red Blood Count  6.50 10^6/uL  High  4.0-5.4  

 

 Hemoglobin  19.8 g/dL  High  14.0-18.0  

 

 Hematocrit  61 %  High  42-52  27

 

 Mean Corpuscular Volume  94 fL  N  80-94  

 

 Mean Corpuscular Hemoglobin  31 pg  N  27-31  

 

 Mean Corpuscular HGB Conc  33 g/dL  N  31-36  

 

 Red Cell Distribution Width  15 %  N  10.5-15  

 

 Platelet Count  206 10^3/uL  N  150-450  

 

 Mean Platelet Volume  9 um3  N  7.4-10.4  

 

 Abs Neutrophils  6.2 10^3/uL  N  1.5-7.7  

 

 Abs Lymphocytes  1.8 10^3/uL  N  1.0-4.8  

 

 Abs Monocytes  1.2 10^3/uL  High  0-0.8  

 

 Abs Eosinophils  0.2 10^3/uL  N  0-0.6  

 

 Abs Basophils  0.1 10^3/uL  N  0-0.2  

 

 Abs Nucleated RBC  0.08 10^3/uL  N    

 

 Granulocyte %  65.2 %  N  38-83  

 

 Lymphocyte %  19.2 %  Low  25-47  

 

 Monocyte %  12.7 %  High  1-9  

 

 Eosinophil %  2.2 %  N  0-6  

 

 Basophil %  0.7 %  N  0-2  

 

 Nucleated Red Blood Cells %  0.9  N    









 Comp Metabolic Panel  2017  [facility]  Sodium  
137 mmol/L  N  133-145  



     [phone]          









 Potassium  4.3 mmol/L  N  3.5-5.0  

 

 Chloride  104 mmol/L  N  101-111  

 

 Co2 Carbon Dioxide  27 mmol/L  N  22-32  

 

 Anion Gap  6 mmol/L  N  2-11  

 

 Glucose  70 mg/dL  N    

 

 Blood Urea Nitrogen  18 mg/dL  N  6-24  

 

 Creatinine  0.97 mg/dL  N  0.67-1.17  

 

 BUN/Creatinine Ratio  18.6  N  8-20  

 

 Calcium  9.1 mg/dL  N  8.6-10.3  

 

 Total Protein  6.4 g/dL  N  6.4-8.9  

 

 Albumin  4.0 g/dL  N  3.2-5.2  

 

 Globulin  2.4 g/dL  N  2-4  

 

 Albumin/Globulin Ratio  1.7  N  1-3  

 

 Total Bilirubin  0.50 mg/dL  N  0.2-1.0  

 

 Alkaline Phosphatase  71 U/L  N    

 

 Alt  25 U/L  N  7-52  

 

 Ast  17 U/L  N  13-39  

 

 Egfr Non-  81.3  N  >60  

 

 Egfr   104.5  N  >60  28









 Laboratory test  2017  [facility]  TSH (Thyroid  
2.01 mcIU/mL  N  0.34-5.60  29



 finding    [phone]  St[facility]orm)        









 Lyme Disease Serology  Negative  N  Negative  30

 

 Vitamin D, 1,25 Dihydroxy  33 pg/mL  N  18-64  31









 Laboratory test  05/15/2017  [facility]  Surgical  SEE 
RESULT      32, 33



 finding    [phone]  Pathology  BELOW      

 

 Comp Metabolic  2016  [facility]  Sodium  135 mmol/
L  N  133-1  



 Panel    [phone]        45  









 Potassium  4.5 mmol/L  N  3.5-5.0  

 

 Chloride  102 mmol/L  N  101-111  

 

 Co2 Carbon Dioxide  27 mmol/L  N  22-32  

 

 Anion Gap  6 mmol/L  N  2-11  

 

 Glucose  89 mg/dL  N    

 

 Blood Urea Nitrogen  10 mg/dL  N  6-24  

 

 Creatinine  0.89 mg/dL  N  0.67-1.17  

 

 BUN/Creatinine Ratio  11.2  N  8-20  

 

 Calcium  9.5 mg/dL  N  8.6-10.3  

 

 Total Protein  6.8 g/dL  N  6.4-8.9  

 

 Albumin  3.9 g/dL  N  3.2-5.2  

 

 Globulin  2.9 g/dL  N  2-4  

 

 Albumin/Globulin Ratio  1.3  N  1-3  

 

 Total Bilirubin  0.40 mg/dL  N  0.2-1.0  

 

 Alkaline Phosphatase  67 U/L  N    

 

 Alt  33 U/L  N  7-52  

 

 Ast  26 U/L  N  13-39  

 

 Egfr Non-  89.8  N  >60  

 

 Egfr   115.4  N  >60  34









 Laboratory test  2016  [facility]  Troponin-I  
0.00 ng/mL  N  <0.04  35



 finding    [phone]  (TnI)        

 

 CBC Auto Diff  2016  [facility]  White Blood  10.2
  N  3.5-10.8  



     [phone]  Count  10^3/uL      









 Red Blood Count  6.45 10^6/uL  High  4.0-5.4  

 

 Hemoglobin  19.0 g/dL  High  14.0-18.0  

 

 Hematocrit  57 %  High  42-52  

 

 Mean Corpuscular Volume  88 fL  N  80-94  

 

 Mean Corpuscular Hemoglobin  30 pg  N  27-31  

 

 Mean Corpuscular HGB Conc  34 g/dL  N  31-36  

 

 Red Cell Distribution Width  14 %  N  10.5-15  

 

 Platelet Count  216 10^3/uL  N  150-450  

 

 Mean Platelet Volume  8 um3  N  7.4-10.4  

 

 Abs Neutrophils  7.3 10^3/uL  N  1.5-7.7  

 

 Abs Lymphocytes  1.7 10^3/uL  N  1.0-4.8  

 

 Abs Monocytes  1.0 10^3/uL  High  0-0.8  

 

 Abs Eosinophils  0.1 10^3/uL  N  0-0.6  

 

 Abs Basophils  0.1 10^3/uL  N  0-0.2  

 

 Abs Nucleated RBC  0.01 10^3/uL  N    

 

 Granulocyte %  71.5 %  N  38-83  

 

 Lymphocyte %  16.4 %  Low  25-47  

 

 Monocyte %  10.0 %  High  1-9  

 

 Eosinophil %  1.3 %  N  0-6  

 

 Basophil %  0.8 %  N  0-2  

 

 Nucleated Red Blood Cells %  0  N    









 Laboratory test  2016  [facility]  Lactic Acid  
1.5 mmol/L  N  0.5-2.0  36



 finding    [phone]          









 C Reactive Protein  3.48 mg/L  N  < 5.00  37









 Laboratory test  2016  [facility]  Surgical  SEE 
RESULT      38



 finding    [phone]  Pathology  BELOW      

 

 Comp Metabolic  2015  [facility]  Sodium  139 mmol/
L  N  133-14  



 Panel    [phone]        5  









 Potassium  4.8 mmol/L  N  3.5-5.0  

 

 Chloride  102 mmol/L  N  101-111  

 

 Co2 Carbon Dioxide  30 mmol/L  N  22-32  

 

 Anion Gap  7 mmol/L  N  2-11  

 

 Glucose  77 mg/dL  N    

 

 Blood Urea Nitrogen  14 mg/dL  N  6-24  

 

 Creatinine  0.88 mg/dL  N  0.67-1.17  

 

 BUN/Creatinine Ratio  15.9  N  8-20  

 

 Calcium  9.7 mg/dL  N  8.6-10.3  

 

 Total Protein  7.0 g/dL  N  6.4-8.9  

 

 Albumin  4.5 g/dL  N  3.2-5.2  

 

 Globulin  2.5 g/dL  N  2-4  

 

 Albumin/Globulin Ratio  1.8  N  1-3  

 

 Total Bilirubin  0.40 mg/dL  N  0.2-1.0  

 

 Alkaline Phosphatase  65 U/L  N    

 

 Alt  30 U/L  N  7-52  

 

 Ast  19 U/L  N  13-39  

 

 Egfr Non-  91.3  N  >60  

 

 Egfr   117.4  N  >60  39









 Lipid Profile  2015  [facility]  Triglycerides  
189 mg/dL  N    40



 (Trig/Chol/HDL)    [phone]          









 Cholesterol  180 mg/dL  N    41

 

 HDL Cholesterol  45.6 mg/dL  N    42

 

 LDL Cholesterol  97 mg/dL  N    43









 Laboratory test  2015  [facility]  Hemoglobin A1c  
5.3 %  N  Less than  44



 finding    [phone]  (Glyco HGB)      6.0  

 

 CBC Auto Diff  2015  [facility]  White Blood  8.7  
N  3.5-10.8  



     [phone]  Count  10^3/uL      









 Red Blood Count  5.76 10^6/uL  High  4.0-5.4  

 

 Hemoglobin  17.4 g/dL  N  14.0-18.0  

 

 Hematocrit  53 %  High  42-52  

 

 Mean Corpuscular Volume  92 fL  N  80-94  

 

 Mean Corpuscular Hemoglobin  30 pg  N  27-31  

 

 Mean Corpuscular HGB Conc  33 g/dL  N  31-36  

 

 Red Cell Distribution Width  14 %  N  10.5-15  

 

 Platelet Count  201 10^3/uL  N  150-450  

 

 Mean Platelet Volume  8 um3  N  7.4-10.4  

 

 Abs Neutrophils  5.9 10^3/uL  N  1.5-7.7  

 

 Abs Lymphocytes  1.8 10^3/uL  N  1.0-4.8  

 

 Abs Monocytes  0.7 10^3/uL  N  0-0.8  

 

 Abs Eosinophils  0.2 10^3/uL  N  0-0.6  

 

 Abs Basophils  0 10^3/uL  N  0-0.2  

 

 Abs Nucleated RBC  0.05 10^3/uL  N    

 

 Granulocyte %  67.7 %  N  38-83  

 

 Lymphocyte %  21.3 %  Low  25-47  

 

 Monocyte %  8.3 %  N  1-9  

 

 Eosinophil %  2.1 %  N  0-6  

 

 Basophil %  0.6 %  N  0-2  

 

 Nucleated Red Blood Cells %  0.6  N    









 Lipid Profile  2014  [facility]  Triglycerides  
119 mg/dL  N    45



 (Trig/Chol/HDL)    [phone]          









 Cholesterol  149 mg/dL  N    46

 

 HDL Cholesterol  36.3 mg/dL  N    47

 

 LDL Cholesterol  89 mg/dL  N    48









 Comp Metabolic Panel  2014  [facility]  Sodium  
136 mmol/L    133-145  



     [phone]          









 Potassium  4.3 mmol/L    3.5-5.0  

 

 Chloride  100 mmol/L  Low  101-111  

 

 Co2 Carbon Dioxide  28.0 mmol/L    22-32  

 

 Anion Gap  8.0 mmol/L    2-11  

 

 Glucose  79 mg/dL      

 

 Blood Urea Nitrogen  13 mg/dL    6-24  

 

 Creatinine  0.90 mg/dL    0.50-1.40  

 

 BUN/Creatinine Ratio  14.4    8-20  

 

 Calcium  9.5 mg/dL    8.1-9.9  

 

 Total Protein  6.4 g/dL    6.2-8.1  

 

 Albumin  4.4 g/dL    3.6-5.4  

 

 Globulin  2.0 g/dL    2-4  

 

 Albumin/Globulin Ratio  2.2    1-3  

 

 Total Bilirubin  1.0 mg/dL    0.4-1.5  

 

 Alkaline Phosphatase  72 U/L      

 

 Alt  49 U/L    14-54  

 

 Ast  23 U/L    12-42  

 

 Egfr Non-  89.7    >60  

 

 Egfr   115.3    >60  49









 Lipid Profile  2014  [facility]  Triglycerides  
181 mg/dL      



 (Trig/Chol/HDL)    [phone]          









 Cholesterol  199 mg/dL    Less than 200  

 

 HDL Cholesterol  47 mg/dL    40-60  50

 

 Cholesterol/HDL Ratio  4.2 Average    1-4.44  

 

 LDL Cholesterol  115.8  High  Less Than 100  51









 Laboratory test  2014  [facility]  Hemoglobin A1c  
5.3 %    Less than  52



 finding    [phone]        6.0  

 

 Clomipramine  2014  [facility]  Clomipramine  103 
   70 - 200  



     [phone]    ng/ml      









 Desmethylclomipramine  203 ng/ml    150 - 300  53









 Basic Metabolic  01/10/2014  [facility]  Sodium  139 mmol
/L    133-145  



 Panel    [phone]          









 Potassium  4.6 mmol/L    3.5-5.0  

 

 Chloride  103 mmol/L    101-111  

 

 Co2 Carbon Dioxide  27.0 mmol/L    22-32  

 

 Anion Gap  9.0 mmol/L    2-11  

 

 Glucose  63 mg/dL  Low    

 

 Blood Urea Nitrogen  16 mg/dL    6-24  

 

 Creatinine  0.80 mg/dL    0.50-1.40  

 

 BUN/Creatinine Ratio  20.0    8-20  

 

 Calcium  9.5 mg/dL    8.1-9.9  

 

 Egfr Non-  102.7    >60  

 

 Egfr   132.1    >60  54









 CBC Auto Diff  01/10/2014  [facility]  White Blood  10.7 
10^3/uL    4.8-10.8  



     [phone]  Count        









 Red Blood Count  6.05 10^6/uL  High  4.0-5.4  

 

 Hemoglobin  18.5 g/dL  High  14.0-18.0  

 

 Hematocrit  54 %  High  42-52  

 

 Mean Corpuscular Volume  88 fL    80-94  

 

 Mean Corpuscular Hemoglobin  31 pg    27-31  

 

 Mean Corpuscular HGB Conc  35 g/dL    31-36  

 

 Red Cell Distribution Width  14 %    10.5-15  

 

 Platelet Count  210 10^3/uL    150-450  

 

 Mean Platelet Volume  9 um3    7.4-10.4  

 

 Abs Neutrophils  7.0 10^3/uL    1.5-7.7  

 

 Abs Lymphocytes  2.4 10^3/uL    1.0-4.8  

 

 Abs Monocytes  1.2 10^3/uL  High  0-0.8  

 

 Abs Eosinophils  0.1 10^3/uL    0-0.6  

 

 Abs Basophils  0.1 10^3/uL    0-0.2  

 

 Abs Nucleated RBC  0.01 10^3/uL      









 Statin  01/10/2014  [facility]  Ast  22 U/L    12-42  



     [phone]          









 Alt  47 U/L    14-54  









 Lipid Profile  01/10/2014  [facility]  Triglycerides  
162 mg/dL      



 (Trig/Chol/HDL)    [phone]          









 Cholesterol  184 mg/dL    Less than 200  

 

 HDL Cholesterol  45 mg/dL    40-60  55

 

 Cholesterol/HDL Ratio  4.1 Average    1-4.44  

 

 LDL Cholesterol  106.6  High  Less Than 100  56









 Manual Differential  01/10/2014  [facility]  Neutrophil 
%  73 %    38-83  



     [phone]          









 Band %  1 %    0-8  

 

 Lymphocytes %  17 %  Low  25-47  

 

 Monocytes %  9 %    0-13  

 

 RBC Morphology  Normal    Normal  









 Laboratory test  10/09/2013  [facility]  Strep Screen  neg    Neg  



 finding    [phone]   -          

 

 CBC Auto Diff  2013  [facility]  White Blood  9.5 
10^3/uL    4.8-10.8  



     [phone]  Count        









 Red Blood Count  6.26 10^6/uL  High  4.0-5.4  

 

 Hemoglobin  18.2 g/dL  High  14.0-18.0  

 

 Hematocrit  57 %  High  42-52  

 

 Mean Corpuscular Volume  91 fL    80-94  

 

 Mean Corpuscular Hemoglobin  29 pg    27-31  

 

 Mean Corpuscular HGB Conc  32 g/dL    31-36  

 

 Red Cell Distribution Width  14 %    10.5-15  

 

 Platelet Count  220 10^3/uL    150-450  

 

 Mean Platelet Volume  9 um3    7.4-10.4  

 

 Abs Neutrophils  6.0 10^3/uL    1.5-7.7  

 

 Abs Lymphocytes  2.3 10^3/uL    1.0-4.8  

 

 Abs Monocytes  1.0 10^3/uL  High  0-0.8  

 

 Abs Eosinophils  0.2 10^3/uL    0-0.6  

 

 Abs Basophils  0.1 10^3/uL    0-0.2  

 

 Abs Nucleated RBC  0 10^3/uL      









 Basic Metabolic  2013  [facility]  Sodium  136 mmol
/L    133-145  



 Panel    [phone]          









 Potassium  4.5 mmol/L    3.5-5.0  

 

 Chloride  105 mmol/L    101-111  

 

 Co2 Carbon Dioxide  24.0 mmol/L    22-32  

 

 Anion Gap  7.0 mmol/L    2-11  

 

 Glucose  82 mg/dL      

 

 Blood Urea Nitrogen  16 mg/dL    6-24  

 

 Creatinine  0.80 mg/dL    0.50-1.40  

 

 BUN/Creatinine Ratio  20.0    8-20  

 

 Calcium  9.3 mg/dL    8.1-9.9  

 

 Egfr Non-  103.2    >60  

 

 Egfr   132.7    >60  57









 Lipid Profile  2013  [facility]  Triglycerides  
125 mg/dL      



 (Trig/Chol/HDL)    [phone]          









 Cholesterol  167 mg/dL    Less than 200  

 

 HDL Cholesterol  36 mg/dL  Low  40-60  58

 

 Cholesterol/HDL Ratio  4.6 Average  High  1-4.44  

 

 LDL Cholesterol  106.0  High  Less Than 100  59









 Statin  2013  [facility]  Ast  24 U/L    12-42  



     [phone]          









 Alt  48 U/L    14-54  









 Manual Differential  2013  [facility]  Neutrophil 
%  54 %    38-83  



     [phone]          









 Lymphocytes %  35 %    25-47  

 

 Monocytes %  5 %    0-13  

 

 Eosinophils %  1 %    0-6  

 

 Basophil %  1 %    0-2  

 

 Reactive Lymph %  4 %    0-6  

 

 RBC Morphology  Normal    Normal  









 Basic Metabolic  2013  [facility]  Sodium  139 mmol
/L    133-145  



 Panel    [phone]          









 Potassium  4.6 mmol/L    3.5-5.0  

 

 Chloride  104 mmol/L    101-111  

 

 Co2 Carbon Dioxide  28.0 mmol/L    22-32  

 

 Anion Gap  7.0 mmol/L    2-11  

 

 Glucose  86 mg/dL      

 

 Blood Urea Nitrogen  15 mg/dL    6-24  

 

 Creatinine  0.90 mg/dL    0.50-1.40  

 

 BUN/Creatinine Ratio  16.7    8-20  

 

 Calcium  9.8 mg/dL    8.1-9.9  

 

 Egfr Non-  90.1    >60  

 

 Egfr   115.8    >60  60









 Statin  2013  [facility]  Ast  23 U/L    12-42  



     [phone]          









 Alt  47 U/L    14-54  









 Lipid Profile  2013  [facility]  Triglycerides  
134 mg/dL      



 (Trig/Chol/HDL)    [phone]          









 Cholesterol  158 mg/dL    Less than 200  

 

 HDL Cholesterol  35 mg/dL  Low  40-60  61

 

 Cholesterol/HDL Ratio  4.5 Average  High  1-4.44  

 

 LDL Cholesterol  96.2 mg/dL    Less Than 100  62









 1  [id number]

 

 2  Consistent with Previous Results Reported on 3/1/2019

 

 3  *******Because ethnic data is not always readily available,



   this report includes an eGFR for both -Americans and



   non- Americans.****



   The National Kidney Disease Education Program (NKDEP) does



   not endorse the use of the MDRD equation for patients that



   are not between the ages of 18 and 70, are pregnant, have



   extremes of body size, muscle mass, or nutritional status,



   or are non- or non-.



   According to the National Kidney Foundation, irrespective of



   diagnosis, the stage of the disease is based on the level of



   kidney function:



   Stage Description                      GFR(mL/min/1.73 m(2))



   1     Kidney damage with normal or decreased GFR       90



   2     Kidney damage with mild decrease in GFR          60-89



   3     Moderate decrease in GFR                         30-59



   4     Severe decrease in GFR                           15-29



   5     Kidney failure                       <15 (or dialysis)

 

 4  Consistent with Previous Results Reported on 2018

 

 5  [id number]

 

 6  [id number]

 

 7  [id number]

 

 8  [id number]

 

 9  Desirable: <150



   Borderline High: 150-199



   High: 200-499



   Very High: >500

 

 10  Desirable: <200



   Borderline High: 200-239



   High: >239

 

 11  Low: <40



   Desirable: 40-60



   High: >60

 

 12  Desirable: <100



   Near Optimal: 100-129



   Borderline High: 130-159



   High: 160-189



   Very High: >189

 

 13  [id number]

 

 14  *******Because ethnic data is not always readily available,



   this report includes an eGFR for both -Americans and



   non- Americans.****



   The National Kidney Disease Education Program (NKDEP) does



   not endorse the use of the MDRD equation for patients that



   are not between the ages of 18 and 70, are pregnant, have



   extremes of body size, muscle mass, or nutritional status,



   or are non- or non-.



   According to the National Kidney Foundation, irrespective of



   diagnosis, the stage of the disease is based on the level of



   kidney function:



   Stage Description                      GFR(mL/min/1.73 m(2))



   1     Kidney damage with normal or decreased GFR       90



   2     Kidney damage with mild decrease in GFR          60-89



   3     Moderate decrease in GFR                         30-59



   4     Severe decrease in GFR                           15-29



   5     Kidney failure                       <15 (or dialysis)

 

 15  Acute inflammation:  >10.00

 

 16  [place] Severe Sepsis and Septic Shock Management Bundle Measure



   requires all lactic acids initially measuring >2.0 mmol/L be



   repeated.

 

 17  SEE RESULT BELOW



   -----------------------------------------------------------------------------
---------------



   Name:  HIPOLITO YANCEY                 : 1965    Attend Dr: Pito Geiger MD



   Acct:  [id number]  Unit: [id number]  AGE: 52            Location:  [facility]



   Re/15/18                        SEX: M             Status:    DEP ER



   -----------------------------------------------------------------------------
---------------



   



   SPEC: [id number]         YASMEEN:   03/15/18-0    [facility] DR: Pito Geiger MD



   REQ:  [id number]              RECD:   03/15/



   STATUS: COMP             [facility] DR: Ginette Littlejohn NP



   _



   SOURCE: NO SOURCE      SPDESC:



   ORDERED:  Blood Cult



   



   -----------------------------------------------------------------------------
---------------



   Procedure                         Result                         Reported   
        Site



   -----------------------------------------------------------------------------
---------------



   Aerobic Culture Bottle  Final                                    18-
9      ML



   No Growth Day 5



   



   Anaerobic Culture Bottle  Final                                  18-
9      ML



   No Growth Day 5



   



   -----------------------------------------------------------------------------
---------------



   * ML - Main Lab



   .



   



   



   



   



   



   



   



   



   



   



   



   



   



   



   



   



   



   



   



   



   



   



   



   



   ** END OF REPORT **



   



   DEPARTMENT OF PATHOLOGY,  [address] [place] [address]



   Phone # [phone]      Fax #[phone]



   Liam Hall M.D. Director     [facility] # [id number]

 

 18  Test Performed by:



   [facility] - [address] [address]

 

 19  [id number]

 

 20  Acute inflammation:  >10.00

 

 21  No bands detected

 

 22  No bands detected

 

 23  Specific serologic response to B. burgdorferi infection is



   not detected, but cannot rule out early infection during



   which low or undetectable antibody levels to B. burgdorferi



   may be present.  If clinically indicated, a new serum



   specimen should be submitted in 7-14 days.



   -------------------ADDITIONAL INFORMATION-------------------



   CDC criteria require >=5 bands for IgG or >=2 bands for IgM



   for the Immunoblot to be considered positive. Bands



   (e.g.,p41) may be detected in patients without Lyme



   disease, and patterns not meeting the CDC criteria should



   be interpreted with caution.



   Immunoblot should be ordered only on specimens that are



   positive or equivocal by a FDA-licensed Lyme disease



   antibody screening test (e.g., EIA).



   Test Performed by:



   [facility] - [address] [address]

 

 24  -------------------ADDITIONAL INFORMATION-------------------



   This test was developed and its performance characteristics



   determined by [facility] in a manner consistent with CLIA



   requirements. This test has not been cleared or approved by



   the U.S. Food and Drug Administration.

 

 25  -------------------ADDITIONAL INFORMATION-------------------



   This test was developed and its performance characteristics



   determined by [facility] in a manner consistent with CLIA



   requirements. This test has not been cleared or approved by



   the U.S. Food and Drug Administration.

 

 26  -------------------ADDITIONAL INFORMATION-------------------



   This test was developed and its performance characteristics



   determined by [facility] in a manner consistent with CLIA



   requirements. This test has not been cleared or approved by



   the U.S. Food and Drug Administration.



   Test Performed by:



   [facility] - [address] [address]

 

 27  Consistent with previous results on 16.

 

 28  *******Because ethnic data is not always readily available,



   this report includes an eGFR for both -Americans and



   non- Americans.****



   The National Kidney Disease Education Program (NKDEP) does



   not endorse the use of the MDRD equation for patients that



   are not between the ages of 18 and 70, are pregnant, have



   extremes of body size, muscle mass, or nutritional status,



   or are non- or non-.



   According to the National Kidney Foundation, irrespective of



   diagnosis, the stage of the disease is based on the level of



   kidney function:



   Stage Description                      GFR(mL/min/1.73 m(2))



   1     Kidney damage with normal or decreased GFR       90



   2     Kidney damage with mild decrease in GFR          60-89



   3     Moderate decrease in GFR                         30-59



   4     Severe decrease in GFR                           15-29



   5     Kidney failure                       <15 (or dialysis)

 

 29  [id number]

 

 30  Serologic response to B. burgdorferi infection is not



   detected, but cannot rule out early infection during



   which low or undetectable antibody levels to



   B. burgdorferi may be present. If clinically indicated,



   a new serum specimen should be submitted in 7-14 days.



   Test Performed by:



   [facility] - [address] [address]

 

 31  -------------------ADDITIONAL INFORMATION-------------------



   This test was developed and its performance characteristics



   determined by [facility] in a manner consistent with CLIA



   requirements. This test has not been cleared or approved by



   the U.S. Food and Drug Administration.



   Test Performed by:



   [facility] - [address] [address]

 

 32  Referred to dermatology

 

 33  SEE RESULT BELOW



   -----------------------------------------------------------------------------
---------------



   Name:  HIPOLITO YANCEY                 : 1965    Attend Dr: Abram Clark MD



   Acct:  [id number]  Unit: [id number]  AGE: 51            Location:  [facility]



   Re/15/17                        SEX: M             Status:    REG REF



   -----------------------------------------------------------------------------
---------------



   



   SPEC: [id number]             YASMEEN: 05/15/      JANNIE DR: Abram Clark MD



   REQ:  [id number]             RECD: 05/15/



   STATUS: SOUT



   _



   ORDERED:  LEVEL 4



   COMMENTS: [id number]



   



   FINAL DIAGNOSIS



   



   



   Skin, right cheek, biopsy:



   -- Basal cell carcinoma, superficial and nodular type, ulcerated.



   -- Lesional cells extend to one lateral specimen edge.



   



   



   



   CLINICAL HISTORY



   



   No history given



   



   GROSS DESCRIPTION



   



   The specimen is received in formalin labeled, Right Cheek Punch Biopsy, and 
consists of a



   0.6 cm tan-brown bosselated hairbearing skin punch excised to a depth of 0.4 
cm which is



   bisected and entirely submitted in one cassette.



   



   Signed __________(signature on file)___________ Joyce Hall MD  [id number]



   



   -----------------------------------------------------------------------------
---------------



   



   



   



   



   



   



   



   



   



   



   



   



   



   



   ** END OF REPORT **



   



   * ML=Testing performed at Main Lab



   DEPARTMENT OF PATHOLOGY,  [address] [place] [address]



   Phone # [phone]      Fax #[phone]



   Liam Hall M.D. Director     [facility] # [id number]

 

 34  *******Because ethnic data is not always readily available,



   this report includes an eGFR for both -Americans and



   non- Americans.****



   The National Kidney Disease Education Program (NKDEP) does



   not endorse the use of the MDRD equation for patients that



   are not between the ages of 18 and 70, are pregnant, have



   extremes of body size, muscle mass, or nutritional status,



   or are non- or non-.



   According to the National Kidney Foundation, irrespective of



   diagnosis, the stage of the disease is based on the level of



   kidney function:



   Stage Description                      GFR(mL/min/1.73 m(2))



   1     Kidney damage with normal or decreased GFR       90



   2     Kidney damage with mild decrease in GFR          60-89



   3     Moderate decrease in GFR                         30-59



   4     Severe decrease in GFR                           15-29



   5     Kidney failure                       <15 (or dialysis)

 

 35  NOTE: Critical Troponin is now >0.03 ng/mL.



   99th percentile=0.04 ng/mL



   



   Troponin results at [facility] and [facility] are not interchangeable.

 

 36  [facility] Severe Sepsis and Septic Shock Management Bundle Measure



   requires all lactic acids initially measuring >2.0 mmol/L be



   repeated.

 

 37  Acute inflammation:  >10.00

 

 38  SEE RESULT BELOW



   -----------------------------------------------------------------------------
---------------



   Name:  HIPOLITO YANCEY                 : 1965    Attend Dr: Abram Clark MD



   Acct:  [id number]  Unit: [id number]  AGE: 50            Location:  [facility]



   Re16                        SEX: M             Status:    REG REF



   -----------------------------------------------------------------------------
---------------



   



   SPEC: [id number]             YASMEEN:       [facility] DR: Abram Clakr MD



   REQ:  [id number]             RECD: [id number]



   STATUS: SOUT



   _



   ORDERED:  LEVEL III



   



   FINAL DIAGNOSIS



   



   



   Skin, site unspecified, excision:



   -- Epidermal inclusion cyst.



   



   



   



   CLINICAL HISTORY



   



   Present several months, is recurrent.



   



   PRE-OPERATIVE DIAGNOSIS



   



   Sebaceous cyst



   



   GROSS DESCRIPTION



   



   The specimen is received in formalin with no source identified, with a 
requisition labeled,



   Sebaceous Cyst, and consists of a 0.8 x 0.5 by 0.3 cm white indurated soft 
tissue fragment.



   The cut surface is a heterogeneous yellow-white mixture.  The specimen is 
inked, trisected



   and entirely submitted in one cassette.



   



   Signed __________(signature on file)___________ Liam Hall MD  1306



   



   -----------------------------------------------------------------------------
---------------



   



   



   



   



   



   



   



   



   



   



   



   ** END OF REPORT **



   



   * ML=Testing performed at Main Lab



   DEPARTMENT OF PATHOLOGY,  [address] [place] [address]



   Phone # [phone]      Fax #[phone]



   Liam Hall M.D. Director     [facility] # [id number]

 

 39  *******Because ethnic data is not always readily available,



   this report includes an eGFR for both -Americans and



   non- Americans.****



   The National Kidney Disease Education Program (NKDEP) does



   not endorse the use of the MDRD equation for patients that



   are not between the ages of 18 and 70, are pregnant, have



   extremes of body size, muscle mass, or nutritional status,



   or are non- or non-.



   According to the National Kidney Foundation, irrespective of



   diagnosis, the stage of the disease is based on the level of



   kidney function:



   Stage Description                      GFR(mL/min/1.73 m(2))



   1     Kidney damage with normal or decreased GFR       90



   2     Kidney damage with mild decrease in GFR          60-89



   3     Moderate decrease in GFR                         30-59



   4     Severe decrease in GFR                           15-29



   5     Kidney failure                       <15 (or dialysis)

 

 40  Desirable <150



   Borderline high 150-199



   High 200-499



   Very High >500

 

 41  Desirable <200



   Borderline high 200-239



   High >239

 

 42  Low <40



   Desirable: 40-60



   High: >60

 

 43  Desirable: <100 mg/dL



   Near Optimal: 100-129 mg/dL



   Borderline High: 130-159 mg/dL



   High: 160-189 mg/dL



   Very High: >189 mg/dL

 

 44  Therapeutic target for the treatment of diabetes



   [place] patients is <7% HBA1C, and in selective



   patients <6.0%.Please refer to American Diabetes



   Association Diabetic care guidelines for further



   information.

 

 45  Desirable <150



   Borderline high 150-199



   High 200-499



   Very High >500

 

 46  Desirable <200



   Borderline high 200-239



   High >239

 

 47  Low <40



   Desirable: 40-60



   High: >60

 

 48  Desirable <100



   Near Optimal 100-129



   Borderline high 130-159



   High 160-189



   Very High >189

 

 49  *******Because ethnic data is not always readily available,



   this report includes an eGFR for both -Americans and



   non- Americans.****



   The National Kidney Disease Education Program (NKDEP) does



   not endorse the use of the MDRD equation for patients that



   are not between the ages of 18 and 70, are pregnant, have



   extremes of body size, muscle mass, or nutritional status,



   or are non- or non-.



   According to the National Kidney Foundation, irrespective of



   diagnosis, the stage of the disease is based on the level of



   kidney function:



   Stage Description                      GFR(mL/min/1.73 m(2))



   1     Kidney damage with normal or decreased GFR       90



   2     Kidney damage with mild decrease in GFR          60-89



   3     Moderate decrease in GFR                         30-59



   4     Severe decrease in GFR                           15-29



   5     Kidney failure                       <15 (or dialysis)

 

 50  HDL Interpretation:



   Undesirable: High Risk:  Less than 40 mg/dL



   Desirable:  Low Risk:  Greater than 60 mg/dL

 

 51  LDL Interpretation:



   Low Risk Optimal Level:  LDL Less than 100 mg/dL



   Near or Above Optimal:  -129 mg/dL



   Borderline High Risk:  -159 mg/dL



   High Risk:  -189 mg/dL



   Very High Risk:  LDL Greater than 189 mg/dL

 

 52  Therapeutic target for the treatment of diabetes



   [place] patients is <7% HBA1C, and in selective



   patients <6.0%.Please refer to American Diabetes



   Association Diabetic care guidelines for further



   information.

 

 53  Desired clomipramine concentrations for the treatment



   of chronic pain:  20 - 85 ng/ml.



   Test Performed by:



   [employer].



   [address] [place] [id number]

 

 54  *******Because ethnic data is not always readily available,



   this report includes an eGFR for both -Americans and



   non- Americans.****



   The National Kidney Disease Education Program (NKDEP) does



   not endorse the use of the MDRD equation for patients that



   are not between the ages of 18 and 70, are pregnant, have



   extremes of body size, muscle mass, or nutritional status,



   or are non- or non-.



   According to the National Kidney Foundation, irrespective of



   diagnosis, the stage of the disease is based on the level of



   kidney function:



   Stage Description                      GFR(mL/min/1.73 m(2))



   1     Kidney damage with normal or decreased GFR       90



   2     Kidney damage with mild decrease in GFR          60-89



   3     Moderate decrease in GFR                         30-59



   4     Severe decrease in GFR                           15-29



   5     Kidney failure                       <15 (or dialysis)

 

 55  HDL Interpretation:



   Undesirable: High Risk:  Less than 40 mg/dL



   Desirable:  Low Risk:  Greater than 60 mg/dL

 

 56  LDL Interpretation:



   Low Risk Optimal Level:  LDL Less than 100 mg/dL



   Near or Above Optimal:  -129 mg/dL



   Borderline High Risk:  -159 mg/dL



   High Risk:  -189 mg/dL



   Very High Risk:  LDL Greater than 189 mg/dL

 

 57  *******Because ethnic data is not always readily available,



   this report includes an eGFR for both -Americans and



   non- Americans.****



   The National Kidney Disease Education Program (NKDEP) does



   not endorse the use of the MDRD equation for patients that



   are not between the ages of 18 and 70, are pregnant, have



   extremes of body size, muscle mass, or nutritional status,



   or are non- or non-.



   According to the National Kidney Foundation, irrespective of



   diagnosis, the stage of the disease is based on the level of



   kidney function:



   Stage Description                      GFR(mL/min/1.73 m(2))



   1     Kidney damage with normal or decreased GFR       90



   2     Kidney damage with mild decrease in GFR          60-89



   3     Moderate decrease in GFR                         30-59



   4     Severe decrease in GFR                           15-29



   5     Kidney failure                       <15 (or dialysis)

 

 58  HDL Interpretation:



   Undesirable: High Risk:  Less than 40 mg/dL



   Desirable:  Low Risk:  Greater than 60 mg/dL

 

 59  LDL Interpretation:



   Low Risk Optimal Level:  LDL Less than 100 mg/dL



   Near or Above Optimal:  -129 mg/dL



   Borderline High Risk:  -159 mg/dL



   High Risk:  -189 mg/dL



   Very High Risk:  LDL Greater than 189 mg/dL

 

 60  *******Because ethnic data is not always readily available,



   this report includes an eGFR for both -Americans and



   non- Americans.****



   The National Kidney Disease Education Program (NKDEP) does



   not endorse the use of the MDRD equation for patients that



   are not between the ages of 18 and 70, are pregnant, have



   extremes of body size, muscle mass, or nutritional status,



   or are non- or non-.



   According to the National Kidney Foundation, irrespective of



   diagnosis, the stage of the disease is based on the level of



   kidney function:



   Stage Description                      GFR(mL/min/1.73 m(2))



   1     Kidney damage with normal or decreased GFR       90



   2     Kidney damage with mild decrease in GFR          60-89



   3     Moderate decrease in GFR                         30-59



   4     Severe decrease in GFR                           15-29



   5     Kidney failure                       <15 (or dialysis)

 

 61  HDL Interpretation:



   Undesirable: High Risk:  Less than 40 MG/DL



   Desirable:  Low Risk:  Greater than 60 MG/DL

 

 62  LDL Interpretation:



   Low Risk Optimal Level:  LDL Less than 100 MG/DL



   Near or Above Optimal:  -129 MG/DL



   Borderline High Risk:  -159 MG/DL



   High Risk:  -189 MG/DL



   Very High Risk:  LDL Greater than 189 MG/DL







Procedures







 Date  Code  Description  Status

 

 2018  68258  EKG, at Least 12 Leads w/Interpretation and Report  
Completed

 

 05/15/2017  96699  Destruction,Premalignant Lesion,1St Lesion  Completed

 

 05/15/2017  06478  BX Of Skin,Tissue, Mucous Membran  Completed

 

 2016  54042  EKG, at Least 12 Leads w/Interpretation and Report  
Completed

 

 2016  58239  Excision Of Lesion (Trunk,Arm,Leg) .6 To 1 CM.  Completed

 

 2016  [id number]  Colonoscopy  Completed

 

 2014  58456  Incision Of Thrombosed Hemorrhoid,External  Completed

 

 2014  93588  EKG, at Least 12 Leads w/Interpretation and Report  
Completed







Encounters







 Type  Date  Location  Provider  Dx  Diagnosis

 

 Office Visit  2019  Main Office  Abram Clark  R60.9  Edema, 
unspecified



   3:00p    MD    

 

 Office Visit  2019  Main Office  Abram Clark  J45.998  Other asthma



   11:30a    MD    

 

 Office Visit  10/12/2018  Main Office  Abram Clark,  R09.81  Nasal 
congestion



   1:45p    MD    

 

 Office Visit  2018  Main Office  Abram Clark,  Z00.00  Encntr for 
general



   1:00p    MD    adult medical exam



           w/o abnormal



           findings









 F32.9  Major depressive disorder, single episode, unspecified

 

 E03.9  Hypothyroidism, unspecified

 

 E78.5  Hyperlipidemia, unspecified

 

 K13.79  Other lesions of oral mucosa

 

 Z23  Encounter for immunization









 Office Visit  2018 11:45a  Main Office  Abram Clark  S64.02xA  
Injury of ulnar



       MD    nerve at



           s/hnd lv of



           left arm, init

 

 Office Visit  2018  9:30a  Main Office  Abram Clark  K13.79  Other 
lesions



       MD    of oral mucosa









 Z01.818  Encounter for other preprocedural examination

 

 L02.31  Cutaneous abscess of buttock









 Office Visit  2018  5:15p  Main Office  Abram Clark  B37.2  
Candidiasis of



       MD    skin and nail

 

 Office Visit  2018  1:45p  Main Office  Abram Clark  K13.79  Other 
lesions of



       MD    oral mucosa









 L30.9  Dermatitis, unspecified









 Office Visit  2018  4:30p  Main Office  Nilda [id number]  Unspecified



       Storm, FNP-C    hemorrhoids

 

 Office Visit  2018  2:00p  Main Office  Abram  M79.671  Pain in right 
foot



       MD Amber    

 

 Office Visit  2017  1:00p  Main Office  Abram  L30.4  Erythema 
intertrigo



       MD Amber    

 

 Office Visit  2017 11:30a  Main Office  Ginette Littlejohn  M86.271  
Subacute



       FNP-C    osteomyelitis,



           right ankle and



           foot









 R71.8  Other abnormality of red blood cells









 Office Visit  2017  3:00p  Main Office  ERUM Peters79.671  Pain 
in right



       FNP-C    foot









 E07.9  Disorder of thyroid, unspecified









 Office Visit  2017  9:45a  Main Office  ERUM Peters79.671  Pain 
in right



       FNP-C    foot

 

 Office Visit  2017  8:45a  Main Office  Dominick Lancaster  R05  Cough



       III, FNP-C    

 

 Office Visit  05/15/2017  2:15p  Main Office  Abram Clark  D48.5  
Neoplasm of



       MD    uncertain



           behavior of skin









 L57.0  Actinic keratosis

 

 S63.610A  Unspecified sprain of right index finger, initial encounter









 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 
fascial



       FNP-C    fibromatosis

 

 Office Visit  2017  9:15a  Main Office  Ginette Littlejohn,  R23.8  Other 
skin changes



       FNP-C    

 

 Office Visit  2017 10:45a  Main Office  Ginette Littlejohn,  L84  Corns and



       FNP-C    callosities

 

 Office Visit  2017  3:45p  Main Office  Dominick Lancaster  H65.02  Acute 
serous otitis



       III, FNP-C    media, left ear

 

 Office Visit  2017  3:00p  Main Office  Ginette Littlejohn,  L05.91  
Pilonidal cyst



       FNP-C    without abscess









 K12.1  Other forms of stomatitis









 Office Visit  2016  9:15a  Main Office  Ginette Littlejohn,  J30.89  Other 
allergic



       FNP-C    rhinitis

 

 Office Visit  2016 11:15a  Main Office  Ginette Littlejohn,  S40.861A  
Insect bite



       FNP-C    (nonvenomous) of



           right upper arm,



           init encntr

 

 Office Visit  10/25/2016  9:00a  Main Office  Dominick Lancaster  L30.9  Dermatitis
,



       III, FNP-C    unspecified

 

 Office Visit  2016 11:30a  Main Office  Dominick Lancaster  K12.2  
Cellulitis and



       III, FNP-C    abscess of mouth

 

 Office Visit  2016  9:00a  Main Office  Dominick Lancaster  L08.9  Local 
infection of



       III, FNP-C    the skin and



           subcutaneous



           tissue, unsp

 

 Office Visit  2016  9:30a  Main Office  Ginette Littlejohn,  Z00.00  Encntr 
for general



       FNP-C    adult medical exam



           w/o abnormal



           findings

 

 Office Visit  2016  2:30p  Main Office  Abram  L30.9  DermatitisAmber MD    unspecified

 

 Office Visit  2015 11:15a  Main Office  Ginette Littlejohn,  I10  Essential



       FNP-C    (primary)



           hypertension









 E78.5  Hyperlipidemia, unspecified

 

 R46.81  Obsessive-compulsive behavior

 

 M54.5  Low back pain









 Office Visit  2015  4:15p  Main Office  Abram  M54.5  Low back pain



       MD Amber    

 

 Office Visit  2015  4:45p  Main Office  Nilda MOYER  J30.9  Allergic 
rhinitis,



       Storm, FNP-C    unspecified

 

 Office Visit  2014 12:15p  Main Office  Ginette Littlejohn,  110.9  
Dermatophytosis



       FNP-C    Unspec Site

 

 Office Visit  10/03/2014 10:30a  Main Office  Ginette Littlejohn,  455.4  
Hemorrhoids External



       FNP-C    Thrombosed

 

 Office Visit  2014  9:15a  Main Office  Ginette Littlejohn,  401.9  
Hypertension Unspec



       FNP-C    

 

 Office Visit  2014  9:00a  Main Office  Ginette Littlejohn,  401.9  
Hypertension Unspec



       FNP-C    









 272.4  Hyperlipidemia Other Unspec









 Office Visit  2014  9:30a  Main Office  Ginette Littlejohn,  455.3  
Hemorrhoids



       FNP-C    External W/O



           Complication

 

 Office Visit  01/10/2014  8:45a  Main Office  Ginette Littlejohn,  401.9  
Hypertension Unspec



       FNP-C    









 272.4  Hyperlipidemia Other Unspec

 

 V04.81  Need For Prophylactic Vaccination & Inoculation/Influenza









 Office Visit  2013 12:00p  Main Office  Nilda MOYER  682.8  Cellulitis & 
Abscess



       Storm, FNP-C    Other Spec Sites

 

 Office Visit  2013 10:00a  Main Office  Ginette Littlejohn,  307.49  Sleep 
Disorder Other



       FNP-C    

 

 Office Visit  10/09/2013 11:30a  Main Office  Ginette Littlejohn,  786.2  Cough



       FNP-C    

 

 Office Visit  2013  9:00a  Main Office  Ginette Littlejohn,  401.9  
Hypertension Unspec



       FNP-C    









 272.4  Hyperlipidemia Other Unspec









 Office Visit  2013 12:15p  Main Office  Hipolito Cotter,  110.3  
Dermatophytosis Brittni DUMONT    & Perianal Area

 

 Office Visit  2013  9:00a  Main Office  Ginette  401.9  Hypertension Unspec



       DILEEP Littlejohn-VERÓNICA    









 786.2  Cough









 Office Visit  2013 10:00a  Main Office  SIGRID Peters  786.2  
Cough









 401.9  Hypertension Unspec









 Office Visit  2013 11:15a  Main Office  Nilda MOYER  455.0  Hemorrhoids 
Internal



       Storm, DILEEP-C    W/O Complication









 564.00  Constipation Unspecified









 Office Visit  2013 10:00a  Main Office  DILEEP Peters-VERÓNICA  786.2  
Cough







Plan of Treatment

03/15/2019 - Abram Clark, MDR60.9 Edema, unspecifiedComments:Pt presents 
after a prior appointment related to edema. He is still having BLE but states 
that it isimproved with the HCTZ. This, along with other symptoms such as 
fatigue and SOB could be related to his anemia. He has started taking an iron 
supplement from the advice of someone not in the office. Wewill check some labs
, see below, to assess current state of anemia and iron status. May require 
referral to hematology in the future.

## 2019-03-28 VITALS — SYSTOLIC BLOOD PRESSURE: 160 MMHG | DIASTOLIC BLOOD PRESSURE: 99 MMHG

## 2019-03-28 LAB
AST SERPL-CCNC: 22 U/L (ref 13–39)
MAGNESIUM SERPL-MCNC: 1.9 MG/DL (ref 1.9–2.7)
POTASSIUM SERPL-SCNC: 4.2 MMOL/L (ref 3.5–5)
TROPONIN I SERPL-MCNC: 0.02 NG/ML (ref ?–0.04)

## 2019-03-28 NOTE — PN
Progress Note





- Progress Note


Date of Service: 03/28/19


Note: 





reflects the information as submitted by the pharmacies.


This report was requested by: Ayden Beltran | Reference #: [id number] 


Others' Prescriptions











Patient Name: Angelito James YOB: 1965


 


Address: [address] [place] [address] Sex: Male














 Rx Written Rx Dispensed Drug Quantity Days Supply Prescriber Name  


 


03/25/2019 03/25/2019 oxycodone hcl 5 mg tablet  10 2 Eduardo Etiennein  


 


08/01/2018 08/01/2018 hydrocodone-acetaminophen 7.5-325 mg/15 ml solution  

300ml 5 Alvin Rosales MD  














Patient Name: Angelito James YOB: 1965


 


Address: [address] [place] [address] Sex: Male














 Rx Written Rx Dispensed Drug Quantity Days Supply Prescriber Name  


 


02/28/2019 03/04/2019 lorazepam 1 mg tablet  90 30 Rowan Coelhoricia (

Npp)  


 


01/09/2019 01/12/2019 lorazepam 1 mg tablet  90 30 Laquita Montserrat (

Npp)  


 


11/29/2018 11/30/2018 lorazepam 1 mg tablet  90 30 Jasiel Seay)  














Patient Name: Angelito James YOB: 1965


 


Address: [address] [place] [address] Sex: Male














 Rx Written Rx Dispensed Drug Quantity Days Supply Prescriber Name  


 


10/31/2018 11/05/2018 androgel 1.62%(1.25g) gel [facility]  113gm 90 Hiram Chavez  


 


10/22/2018 10/22/2018 lorazepam 1 mg tablet  90 30 Rowan Coelhoricia (

Npp)  


 


09/13/2018 09/13/2018 lorazepam 1 mg tablet  90 30 Rowan Coelhoricia (

Npp)  


 


08/13/2018 08/13/2018 lorazepam 1 mg tablet  90 30 Jasiel Seay)  


 


07/13/2018 07/15/2018 lorazepam 1 mg tablet  90 30 Jasiel Seay)  


 


06/11/2018 06/13/2018 lorazepam 1 mg tablet  90 30 Jasiel Seay)  


 


05/04/2018 05/07/2018 lorazepam 1 mg tablet  90 30 Jasiel Seay)  


 


04/24/2018 05/02/2018 androgel 1.62%(1.25g) gel kt  113gm Hiram Mares  


 


04/06/2018 04/06/2018 lorazepam 1 mg tablet  90 30 Jasiel Seay)

## 2019-03-28 NOTE — HP
CC:  Dr. Clark

 

HISTORY AND PHYSICAL:

 

DATE OF ADMISSION:  03/28/19

 

TIME OF EVALUATION:  3:20 p.m.

 

PRIMARY CARE PROVIDER:  Dr. Clark.

 

CHIEF COMPLAINT:  "My throat hurts."

 

HISTORY OF PRESENT ILLNESS:  Mr. James is a 53-year-old male with a past medical history of hypert
ension, hypothyroidism, anxiety, OCD with schizoid traits, osteoporosis, hyperlipidemia who presented
 to the emergency room with complaints of sore throat.  He was admitted to [facility] on 03/25/19 to have a 
surgical procedure with Dr. Martinez.  He had had a right foot second metatarsal stress fracture with n
onunion.  He underwent open reduction and internal fixation with tibial bone grafts and was feeling w
ell.  One of the proximal screws had loosened over time. He had no pain, but had some discomfort in t
he area.  Dr. Martinez took him to the OR on 03/25/19  for removal of hardware in the right midfoot.  T
 patient went home the same day and he states that he was initially feeling well, but yesterday anali
gaurav he started to have some sore throat, especially when swallowing and this progressed with cough a
nd chest congestion.

 

He came to the emergency room, where he had an unremarkable workup, but his saturation dropped to the
 high 80s with conversation only.  Later on, the patient ambulated on room air and his oxygen saturat
ion dropped as low as 85% and he complained of mild shortness of breath while ambulating.  For that r
candice, the hospitalist service was called.

 

He denies fever, chills, chest pain, palpitations.  He states that in the past he had a blood test an
d was told "that my oxygen was not that good."  He has a history of asthma and states that he usually
 cannot feel his wheezing and this time it feels different.

 

PAST MEDICAL HISTORY:

1.  Morbid obesity with a BMI of 41.

2.  Hypertension.

3.  Hypothyroidism.

4.  Anxiety.

5.  Obsessive-compulsive disorder with schizoid traits.

6.  Osteoporosis.

7.  Hyperlipidemia.

8.  History of pituitary tumor.

9.  Removal of right midfoot hardware by Dr. Martinez on 03/25/19.

 

MEDICATIONS:

1.  Albuterol HFA 1 to 2 puffs inhaled q.4 hours p.r.n. for shortness of breath.

2.  Aripiprazole 10 mg p.o. at bedtime.

3.  Clomipramine 50 mg p.o. in the morning and 150 mg p.o. at bedtime.

4.  Gabapentin 600 mg p.o. t.i.d.

5.  Hydrochlorothiazide 25 mg p.o. q.a.m.

6.  Hydroxyzine 50 mg p.o. t.i.d.

7.  Levocetirizine 5 mg p.o. daily.

8.  Loratadine 10 mg p.o. daily.

9.  Lorazepam 1 mg p.o. t.i.d.

10.  Mometasone nasal spray 50 mcg to both nares daily as needed for allergies.

11.  Seroquel 600 mg p.o. at bedtime.

12.  Simvastatin 20 mg p.o. at bedtime.

13.  AndroGel 1 packet topical daily.

14.  Trazodone 50 mg p.o. b.i.d.

 

ALLERGIES:  Environmental allergies.

 

FAMILY HISTORY:  Positive for heart disease, hypertension, stroke, and cancer.

 

SOCIAL HISTORY:  He denies a history of alcohol or tobacco or drug use.  Surrogate decision maker is 
his father, Sen James, phone number is [phone].

 

REVIEW OF SYSTEMS:  A 14-point review of systems was performed and all the pertinent negatives and po
sitives are as above in the HPI.

 

                               PHYSICAL EXAMINATION

 

GENERAL:  The patient is a morbidly obese, middle-aged gentleman sitting up on the ED stretcher, in n
o acute distress.

 

VITAL SIGNS:  Temperature 99.6, heart rate is 102, respiratory rate is 22, oxygen saturation is 94% o
n 2 L nasal cannula, and blood pressure is 137/85.

 

HEENT:  Pupils are equal.  Moist mucous membranes.

 

CVS:  Normal S1 and S2.  Regular rate and rhythm.

 

LUNGS:  Chest sounds heard bilaterally with no added sounds.

 

ABDOMEN:  Morbidly obese, nontender.  Bowel sounds are present.

 

EXTREMITIES:  There is bilateral moderate lower extremity edema.

 

NEUROLOGIC:  He is alert and oriented x3.  Able to move all 4 extremities.

 

 DIAGNOSTIC STUDIES/LAB DATA:  The patient had a CBC that showed a WBC of 12.8 with a hemoglobin of 1
5.1, hematocrit of 49, and platelet count of 340 with an MCV of 69 and an MCH of 21.  D-dimer was les
s than 200.  Chemistry showed a sodium of 139, potassium of 4.2, chloride of 99, bicarb of 27, BUN of
 14, creatinine of 0.9, glucose of 82, calcium 9.5, magnesium 1.9.  LFTs were normal.  Troponin was 0
.02.

 

Influenza and group A strep rapid tests were all negative.

 

CT of the neck without contrast showed no acute findings.

 

EKG done on 03/27/19 at 2322 showed sinus tachycardia at 114 beats per minute with no acute ischemic 
changes, just left anterior fascicular block.  This EKG is different from his prior one from 2010 bec
ause at that time he had paroxysmal atrial tachycardia.

 

Chest x-ray was not yet officially read and to my read, it does not show acute pulmonary disease.

 

ASSESSMENT AND PLAN:  Mr. James is a 53-year-old male with a past medical history of hypertension,
 hypothyroidism, anxiety, obsessive-compulsive disorder with schizoid traits, osteoporosis, hyperlipi
demia, and pituitary tumor who was admitted to [facility] on 03/25/19 for removal of right midfoot hardware 
and now presented to the emergency room with complaints of sore throat and dyspnea and found to be hy
poxic.

 

1.  Hypoxia.  It is unclear if his hypoxia is new or if he has been hypoxic with exertion, but was un
aware.

 

With his recent surgery, he is at risk for pulmonary embolism, especially considering his lower extre
mity edema.  His Wells criteria is 6, which makes him moderate probability for pulmonary embolism, so
 even though his d-dimer is negative, we should pursue a CT of the chest to rule out that diagnosis.

 

He does not seem to be in asthma exacerbation at this time or have another pulmonary process.

 

One possibility is that the patient has chronic hypoxia associated with obesity hypoventilation syndr
ome that has not been previously diagnosed.

 

2.  Lower extremity edema.  The patient is being worked up as an outpatient by his primary care angii
colby and was started on hydrochlorothiazide with some improvement.

3.  Hypertension is controlled.  We will continue his hydrochlorothiazide.

4.  Obsessive-compulsive disorder.  We will continue clomipramine, Seroquel, and trazodone.

5.  DVT prophylaxis.  The patient has a score of 3 on the DVT Prophylaxis Risk Assessment Guide and h
e will be started on subcutaneous heparin.

6.  Code status is full.

 

TIME SPENT:  Approximately 50 minutes were spent with this patient interview, medical records review,
 physical examination to complete the admission.  More than half of this time was spent face-to-face 
with the patient in coordination of care.

 

 

 

[id number]/[facility] #: [id number]

## 2019-03-28 NOTE — DS
CC:  Dr. Clark *

 

DISCHARGE SUMMARY:

 

DATE OF ADMISSION:

 

DATE OF DISCHARGE:  03/28/19

 

HISTORY OF PRESENT ILLNESS:  This 53-year-old man presented with a complaint of 
sore throat, hoarseness, and productive cough.  History is detailed in the 
admission note.

 

He was noted to be hypoxic in the emergency room with saturation dropping as 
low as 85% while walking.  The patient had a CTA of the chest which did not 
show any evidence of pulmonary emboli.  Noticed D-dimer was normal.  He 
recently had orthopedic surgery and was considered as high risk.

 

On the morning of discharge, his O2 saturation on room air was 91% to 92%.  He 
was quite comfortable.  He was noticeably a little hoarse.  He was breathing 
slowly and easily.  He said he was brining up some white mucus, but was not 
short of breath. He generally was in good condition.  No chest pain or 
shortness of breath.

 

I note the patient had had a sleep lab study many years ago for possible sleep 
apnea but this, according to the patient, was negative.

 

The CT scan of the chest did show some probably chronic vertebral compression 
fractures.  I have started the patient on vitamin D 2000 units daily.  He did 
have a vitamin D level in 2017, but I will leave further management up to his 
primary care provider.

 

FINAL DIAGNOSES:

1.  Viral upper respiratory infection.

2.  Morbid obesity.

3.  Hypertension.

4.  Hypothyroidism.

5.  Anxiety.

6.  Obsessive compulsive disorder.

7.  Osteoporosis.

8.  Hyperlipidemia.

 

DISCHARGE MEDICATIONS:

1.  Vitamin D 2000 units daily.

2.  Guaifenesin and codeine 5 mL every 4 hours p.r.n.

3.  Albuterol inhaler 1 to 2 puffs every 4 hours p.r.n.

4.  Quetiapine 600 mg h.s.

5.  Trazodone 50 mg b.i.d.

6.  Aripiprazole 10 mg h.s.

7.  Hydroxyzine 50 mg t.i.d.

8.  Clomipramine 150 mg h.s. and 50 mg in the morning.

9.  Gabapentin 600 mg t.i.d.

10.  Mometasone nasal spray 50 mcg both nares p.r.n.

11.  Lorazepam 1 mg t.i.d.

12.  Simvastatin 20 mg h.s.

13.  Hydrochlorothiazide 25 mg daily.

14.  Testosterone 1 packet topical daily.

15.  Loratadine 10 mg daily.

16.  Levocetirizine 5 mg daily.



CONDITION ON DISCHARGE:  Improved.

 

DISPOSITION ON DISCHARGE:  Discharged home.

 

 

 [id number]/[facility] #: [id number]

[facility] CenterRAVEN